# Patient Record
Sex: MALE | Race: WHITE | Employment: UNEMPLOYED | URBAN - METROPOLITAN AREA
[De-identification: names, ages, dates, MRNs, and addresses within clinical notes are randomized per-mention and may not be internally consistent; named-entity substitution may affect disease eponyms.]

---

## 2021-02-21 ENCOUNTER — APPOINTMENT (OUTPATIENT)
Dept: GENERAL RADIOLOGY | Age: 36
DRG: 897 | End: 2021-02-21

## 2021-02-21 ENCOUNTER — HOSPITAL ENCOUNTER (INPATIENT)
Age: 36
LOS: 2 days | Discharge: HOME | DRG: 897 | End: 2021-02-23
Attending: EMERGENCY MEDICINE | Admitting: INTERNAL MEDICINE

## 2021-02-21 ENCOUNTER — APPOINTMENT (OUTPATIENT)
Dept: CT IMAGING | Age: 36
DRG: 897 | End: 2021-02-21

## 2021-02-21 DIAGNOSIS — R56.9 NEW ONSET SEIZURE (HCC): Primary | ICD-10-CM

## 2021-02-21 DIAGNOSIS — F10.939 ALCOHOL WITHDRAWAL SYNDROME WITH COMPLICATION (HCC): ICD-10-CM

## 2021-02-21 DIAGNOSIS — R79.89 ELEVATED LIVER FUNCTION TESTS: ICD-10-CM

## 2021-02-21 PROBLEM — F10.930 SEIZURE DUE TO ALCOHOL WITHDRAWAL, UNCOMPLICATED (HCC): Status: ACTIVE | Noted: 2021-02-21

## 2021-02-21 LAB
A/G RATIO: 1.3 (ref 1.1–2.2)
ACETAMINOPHEN LEVEL: <5 UG/ML (ref 10–30)
ALBUMIN SERPL-MCNC: 4.3 G/DL (ref 3.4–5)
ALP BLD-CCNC: 127 U/L (ref 40–129)
ALT SERPL-CCNC: 71 U/L (ref 10–40)
AMPHETAMINE SCREEN, URINE: NORMAL
ANION GAP SERPL CALCULATED.3IONS-SCNC: 27 MMOL/L (ref 3–16)
AST SERPL-CCNC: 263 U/L (ref 15–37)
BARBITURATE SCREEN URINE: NORMAL
BASOPHILS ABSOLUTE: 0.1 K/UL (ref 0–0.2)
BASOPHILS RELATIVE PERCENT: 1.4 %
BENZODIAZEPINE SCREEN, URINE: NORMAL
BILIRUB SERPL-MCNC: 1.5 MG/DL (ref 0–1)
BILIRUBIN URINE: NEGATIVE
BLOOD, URINE: NEGATIVE
BUN BLDV-MCNC: 4 MG/DL (ref 7–20)
CALCIUM SERPL-MCNC: 9.5 MG/DL (ref 8.3–10.6)
CANNABINOID SCREEN URINE: NORMAL
CHLORIDE BLD-SCNC: 91 MMOL/L (ref 99–110)
CLARITY: CLEAR
CO2: 14 MMOL/L (ref 21–32)
COCAINE METABOLITE SCREEN URINE: NORMAL
COLOR: YELLOW
CREAT SERPL-MCNC: 0.8 MG/DL (ref 0.9–1.3)
EOSINOPHILS ABSOLUTE: 0 K/UL (ref 0–0.6)
EOSINOPHILS RELATIVE PERCENT: 1.1 %
ETHANOL: NORMAL MG/DL (ref 0–0.08)
GFR AFRICAN AMERICAN: >60
GFR NON-AFRICAN AMERICAN: >60
GLOBULIN: 3.4 G/DL
GLUCOSE BLD-MCNC: 154 MG/DL (ref 70–99)
GLUCOSE URINE: NEGATIVE MG/DL
HCT VFR BLD CALC: 37.5 % (ref 40.5–52.5)
HEMOGLOBIN: 12.3 G/DL (ref 13.5–17.5)
KETONES, URINE: NEGATIVE MG/DL
LACTIC ACID: 11.9 MMOL/L (ref 0.4–2)
LEUKOCYTE ESTERASE, URINE: NEGATIVE
LIPASE: 23 U/L (ref 13–60)
LYMPHOCYTES ABSOLUTE: 1.3 K/UL (ref 1–5.1)
LYMPHOCYTES RELATIVE PERCENT: 27.8 %
Lab: NORMAL
MAGNESIUM: 2.2 MG/DL (ref 1.8–2.4)
MCH RBC QN AUTO: 31.9 PG (ref 26–34)
MCHC RBC AUTO-ENTMCNC: 32.8 G/DL (ref 31–36)
MCV RBC AUTO: 97.2 FL (ref 80–100)
METHADONE SCREEN, URINE: NORMAL
MICROSCOPIC EXAMINATION: ABNORMAL
MONOCYTES ABSOLUTE: 0.6 K/UL (ref 0–1.3)
MONOCYTES RELATIVE PERCENT: 12.7 %
NEUTROPHILS ABSOLUTE: 2.6 K/UL (ref 1.7–7.7)
NEUTROPHILS RELATIVE PERCENT: 57 %
NITRITE, URINE: NEGATIVE
OPIATE SCREEN URINE: NORMAL
OXYCODONE URINE: NORMAL
PDW BLD-RTO: 18.5 % (ref 12.4–15.4)
PH UA: 7.5
PH UA: 7.5 (ref 5–8)
PHENCYCLIDINE SCREEN URINE: NORMAL
PLATELET # BLD: 139 K/UL (ref 135–450)
PLATELET SLIDE REVIEW: ADEQUATE
PMV BLD AUTO: 8.9 FL (ref 5–10.5)
POTASSIUM REFLEX MAGNESIUM: 3.3 MMOL/L (ref 3.5–5.1)
PROPOXYPHENE SCREEN: NORMAL
PROTEIN UA: NEGATIVE MG/DL
RBC # BLD: 3.85 M/UL (ref 4.2–5.9)
SALICYLATE, SERUM: <0.3 MG/DL (ref 15–30)
SODIUM BLD-SCNC: 132 MMOL/L (ref 136–145)
SPECIFIC GRAVITY UA: 1.01 (ref 1–1.03)
TOTAL PROTEIN: 7.7 G/DL (ref 6.4–8.2)
URINE TYPE: ABNORMAL
UROBILINOGEN, URINE: 4 E.U./DL
WBC # BLD: 4.6 K/UL (ref 4–11)

## 2021-02-21 PROCEDURE — 80143 DRUG ASSAY ACETAMINOPHEN: CPT

## 2021-02-21 PROCEDURE — 83605 ASSAY OF LACTIC ACID: CPT

## 2021-02-21 PROCEDURE — 81003 URINALYSIS AUTO W/O SCOPE: CPT

## 2021-02-21 PROCEDURE — 83690 ASSAY OF LIPASE: CPT

## 2021-02-21 PROCEDURE — 6360000002 HC RX W HCPCS: Performed by: EMERGENCY MEDICINE

## 2021-02-21 PROCEDURE — HZ2ZZZZ DETOXIFICATION SERVICES FOR SUBSTANCE ABUSE TREATMENT: ICD-10-PCS | Performed by: EMERGENCY MEDICINE

## 2021-02-21 PROCEDURE — 2060000000 HC ICU INTERMEDIATE R&B

## 2021-02-21 PROCEDURE — 80179 DRUG ASSAY SALICYLATE: CPT

## 2021-02-21 PROCEDURE — 6370000000 HC RX 637 (ALT 250 FOR IP): Performed by: EMERGENCY MEDICINE

## 2021-02-21 PROCEDURE — 85025 COMPLETE CBC W/AUTO DIFF WBC: CPT

## 2021-02-21 PROCEDURE — 96374 THER/PROPH/DIAG INJ IV PUSH: CPT

## 2021-02-21 PROCEDURE — 70450 CT HEAD/BRAIN W/O DYE: CPT

## 2021-02-21 PROCEDURE — 96375 TX/PRO/DX INJ NEW DRUG ADDON: CPT

## 2021-02-21 PROCEDURE — 71045 X-RAY EXAM CHEST 1 VIEW: CPT

## 2021-02-21 PROCEDURE — 82077 ASSAY SPEC XCP UR&BREATH IA: CPT

## 2021-02-21 PROCEDURE — 99285 EMERGENCY DEPT VISIT HI MDM: CPT

## 2021-02-21 PROCEDURE — 80307 DRUG TEST PRSMV CHEM ANLYZR: CPT

## 2021-02-21 PROCEDURE — 83735 ASSAY OF MAGNESIUM: CPT

## 2021-02-21 PROCEDURE — 93005 ELECTROCARDIOGRAM TRACING: CPT | Performed by: EMERGENCY MEDICINE

## 2021-02-21 PROCEDURE — 80053 COMPREHEN METABOLIC PANEL: CPT

## 2021-02-21 PROCEDURE — 2580000003 HC RX 258: Performed by: EMERGENCY MEDICINE

## 2021-02-21 PROCEDURE — 96361 HYDRATE IV INFUSION ADD-ON: CPT

## 2021-02-21 RX ORDER — LORAZEPAM 1 MG/1
1 TABLET ORAL
Status: DISCONTINUED | OUTPATIENT
Start: 2021-02-21 | End: 2021-02-23

## 2021-02-21 RX ORDER — ONDANSETRON 2 MG/ML
4 INJECTION INTRAMUSCULAR; INTRAVENOUS EVERY 6 HOURS PRN
Status: DISCONTINUED | OUTPATIENT
Start: 2021-02-21 | End: 2021-02-23 | Stop reason: HOSPADM

## 2021-02-21 RX ORDER — 0.9 % SODIUM CHLORIDE 0.9 %
1000 INTRAVENOUS SOLUTION INTRAVENOUS ONCE
Status: COMPLETED | OUTPATIENT
Start: 2021-02-21 | End: 2021-02-22

## 2021-02-21 RX ORDER — THIAMINE HYDROCHLORIDE 100 MG/ML
100 INJECTION, SOLUTION INTRAMUSCULAR; INTRAVENOUS ONCE
Status: COMPLETED | OUTPATIENT
Start: 2021-02-21 | End: 2021-02-21

## 2021-02-21 RX ORDER — GAUZE BANDAGE 2" X 2"
100 BANDAGE TOPICAL DAILY
Status: DISCONTINUED | OUTPATIENT
Start: 2021-02-22 | End: 2021-02-23 | Stop reason: HOSPADM

## 2021-02-21 RX ORDER — LORAZEPAM 2 MG/ML
1 INJECTION INTRAMUSCULAR
Status: ACTIVE | OUTPATIENT
Start: 2021-02-21 | End: 2021-02-21

## 2021-02-21 RX ORDER — POTASSIUM CHLORIDE 20 MEQ/1
40 TABLET, EXTENDED RELEASE ORAL PRN
Status: DISCONTINUED | OUTPATIENT
Start: 2021-02-21 | End: 2021-02-23 | Stop reason: HOSPADM

## 2021-02-21 RX ORDER — ACETAMINOPHEN 500 MG
1000 TABLET ORAL ONCE
Status: COMPLETED | OUTPATIENT
Start: 2021-02-21 | End: 2021-02-21

## 2021-02-21 RX ORDER — SODIUM CHLORIDE, SODIUM LACTATE, POTASSIUM CHLORIDE, CALCIUM CHLORIDE 600; 310; 30; 20 MG/100ML; MG/100ML; MG/100ML; MG/100ML
INJECTION, SOLUTION INTRAVENOUS CONTINUOUS
Status: ACTIVE | OUTPATIENT
Start: 2021-02-21 | End: 2021-02-22

## 2021-02-21 RX ORDER — LORAZEPAM 2 MG/ML
3 INJECTION INTRAMUSCULAR
Status: DISCONTINUED | OUTPATIENT
Start: 2021-02-21 | End: 2021-02-23

## 2021-02-21 RX ORDER — LORAZEPAM 1 MG/1
2 TABLET ORAL
Status: DISCONTINUED | OUTPATIENT
Start: 2021-02-21 | End: 2021-02-23

## 2021-02-21 RX ORDER — SODIUM CHLORIDE 0.9 % (FLUSH) 0.9 %
10 SYRINGE (ML) INJECTION EVERY 12 HOURS SCHEDULED
Status: DISCONTINUED | OUTPATIENT
Start: 2021-02-21 | End: 2021-02-23 | Stop reason: HOSPADM

## 2021-02-21 RX ORDER — ACETAMINOPHEN 650 MG/1
650 SUPPOSITORY RECTAL EVERY 6 HOURS PRN
Status: DISCONTINUED | OUTPATIENT
Start: 2021-02-21 | End: 2021-02-22

## 2021-02-21 RX ORDER — LORAZEPAM 2 MG/ML
1 INJECTION INTRAMUSCULAR
Status: DISCONTINUED | OUTPATIENT
Start: 2021-02-21 | End: 2021-02-23

## 2021-02-21 RX ORDER — LORAZEPAM 2 MG/ML
4 INJECTION INTRAMUSCULAR
Status: DISCONTINUED | OUTPATIENT
Start: 2021-02-21 | End: 2021-02-23

## 2021-02-21 RX ORDER — LORAZEPAM 1 MG/1
3 TABLET ORAL
Status: DISCONTINUED | OUTPATIENT
Start: 2021-02-21 | End: 2021-02-23

## 2021-02-21 RX ORDER — ACETAMINOPHEN 325 MG/1
650 TABLET ORAL EVERY 6 HOURS PRN
Status: DISCONTINUED | OUTPATIENT
Start: 2021-02-21 | End: 2021-02-22

## 2021-02-21 RX ORDER — POLYETHYLENE GLYCOL 3350 17 G/17G
17 POWDER, FOR SOLUTION ORAL DAILY PRN
Status: DISCONTINUED | OUTPATIENT
Start: 2021-02-21 | End: 2021-02-23 | Stop reason: HOSPADM

## 2021-02-21 RX ORDER — PROMETHAZINE HYDROCHLORIDE 12.5 MG/1
12.5 TABLET ORAL EVERY 6 HOURS PRN
Status: DISCONTINUED | OUTPATIENT
Start: 2021-02-21 | End: 2021-02-23 | Stop reason: HOSPADM

## 2021-02-21 RX ORDER — LORAZEPAM 2 MG/ML
2 INJECTION INTRAMUSCULAR
Status: DISCONTINUED | OUTPATIENT
Start: 2021-02-21 | End: 2021-02-23

## 2021-02-21 RX ORDER — SODIUM CHLORIDE 0.9 % (FLUSH) 0.9 %
10 SYRINGE (ML) INJECTION PRN
Status: DISCONTINUED | OUTPATIENT
Start: 2021-02-21 | End: 2021-02-23 | Stop reason: HOSPADM

## 2021-02-21 RX ORDER — 0.9 % SODIUM CHLORIDE 0.9 %
1000 INTRAVENOUS SOLUTION INTRAVENOUS ONCE
Status: COMPLETED | OUTPATIENT
Start: 2021-02-21 | End: 2021-02-21

## 2021-02-21 RX ORDER — POTASSIUM CHLORIDE 750 MG/1
40 TABLET, EXTENDED RELEASE ORAL ONCE
Status: COMPLETED | OUTPATIENT
Start: 2021-02-21 | End: 2021-02-21

## 2021-02-21 RX ORDER — MAGNESIUM SULFATE IN WATER 40 MG/ML
2000 INJECTION, SOLUTION INTRAVENOUS PRN
Status: DISCONTINUED | OUTPATIENT
Start: 2021-02-21 | End: 2021-02-23 | Stop reason: HOSPADM

## 2021-02-21 RX ORDER — MULTIVITAMIN WITH IRON
1 TABLET ORAL DAILY
Status: DISCONTINUED | OUTPATIENT
Start: 2021-02-22 | End: 2021-02-23 | Stop reason: HOSPADM

## 2021-02-21 RX ORDER — LORAZEPAM 2 MG/ML
1 INJECTION INTRAMUSCULAR ONCE
Status: COMPLETED | OUTPATIENT
Start: 2021-02-21 | End: 2021-02-21

## 2021-02-21 RX ORDER — POTASSIUM CHLORIDE 7.45 MG/ML
10 INJECTION INTRAVENOUS PRN
Status: DISCONTINUED | OUTPATIENT
Start: 2021-02-21 | End: 2021-02-23 | Stop reason: HOSPADM

## 2021-02-21 RX ORDER — LORAZEPAM 2 MG/ML
2 INJECTION INTRAMUSCULAR EVERY 6 HOURS PRN
Status: DISCONTINUED | OUTPATIENT
Start: 2021-02-21 | End: 2021-02-23

## 2021-02-21 RX ORDER — LORAZEPAM 1 MG/1
4 TABLET ORAL
Status: DISCONTINUED | OUTPATIENT
Start: 2021-02-21 | End: 2021-02-23

## 2021-02-21 RX ADMIN — THIAMINE HYDROCHLORIDE 100 MG: 100 INJECTION, SOLUTION INTRAMUSCULAR; INTRAVENOUS at 18:00

## 2021-02-21 RX ADMIN — POTASSIUM CHLORIDE 40 MEQ: 750 TABLET, EXTENDED RELEASE ORAL at 19:03

## 2021-02-21 RX ADMIN — SODIUM CHLORIDE 1000 ML: 9 INJECTION, SOLUTION INTRAVENOUS at 17:49

## 2021-02-21 RX ADMIN — LORAZEPAM 1 MG: 2 INJECTION INTRAMUSCULAR; INTRAVENOUS at 17:50

## 2021-02-21 RX ADMIN — SODIUM CHLORIDE 1000 ML: 9 INJECTION, SOLUTION INTRAVENOUS at 19:04

## 2021-02-21 RX ADMIN — ACETAMINOPHEN 1000 MG: 500 TABLET ORAL at 19:03

## 2021-02-21 NOTE — ED PROVIDER NOTES
CRITICAL CARE NOTE  There was a high probability of clinical significant / life threatening deterioration in the patient's condition which required my urgent intervention. Total critical care time was at least 31 minutes excluding any separately reported procedures. TRIAGE CHIEF COMPLAINT:   Chief Complaint   Patient presents with    Seizures       HPI: Brittney Tanner is a 38 y.o. male who presents to the emergency department with complaint of seizure possibly related to alcohol. [facility] that he was leaving his work at the [employer] when he apparently passed out or fell. People tried to help him but noticed that he was shaking. His blood sugar was 166. The patient is amnesic for the episode. There was no incontinence but he did have tongue biting. He denies history of previous seizure. Denies headache or neck pain. Patient has a history of alcohol abuse and has been seen at least twice in the ED for alcohol intoxication. He did have a DUI and underwent treatment a number of years ago at the [facility]. He states he drinks at least 6 beers a day and sometimes shots. The patient last drank alcohol yesterday. He last had something to eat last night and came to work today around 10 AM.  Denies chest pain or shortness of breath. He has no abdominal pain. REVIEW OF SYSTEMS:   10 systems reviewed. Pertinent positives per HPI. Otherwise noted to be negative. I have reviewed the triage/nursing documentation and agree unless otherwise noted below. PAST MEDICAL HISTORY:   History reviewed. No pertinent past medical history. CURRENT MEDICATIONS:   Patient's Medications    No medications on file        SURGICAL HISTORY:       Procedure Laterality Date    FRACTURE SURGERY  ankle        FAMILY HISTORY:   History reviewed. No pertinent family history. SOCIAL HISTORY:    reports that he has never smoked.  He has never used smokeless tobacco. He reports current alcohol use of about 30.0 standard drinks of alcohol per week. He reports that he does not use drugs. ALLERGIES: No Known Allergies    PHYSICAL EXAM:  VITAL SIGNS: /81   Pulse 128   Temp 97.7 °F (36.5 °C)   Wt 155 lb (70.3 kg)   SpO2 100%   BMI 25.79 kg/m²   Constitutional:  No acute distress, Non-toxic appearance. Patient is postictal.  HENT: Normocephalic, Atraumatic Oropharynx moist, No oral exudates. TMs are normal.  Superficial laceration on the right side of the tongue. No other intraoral injury. Eyes:  PERRL, EOMI, Conjunctiva normal, No discharge. No nystagmus. Neck: No tenderness, Supple, No lymphadenopathy, No stridor. Cardiovascular: Regular tachycardia at a rate of 125., No murmurs, No rubs, No gallops. Pulmonary/Chest:  Normal breath sounds, No respiratory distress, No wheezing,  Abdomen:   Soft, No tenderness, No masses, No pulsatile masses  Back:  No tenderness, No CVA tenderness  Extremities:  Normal range of motion, Intact distal pulses, No edema, No tenderness  Neurologic:  Alert & oriented x 3, Speech is clear and appropriate, No upper extremity drift or lower extremity weakness,  Normal sensory function, No facial asymmetry, no truncal or extremity ataxia. Normal gait. Skin:  Warm, Dry, No erythema, No rash  Psychiatric:  Affect normal, Mood normal      EKG:    EKG interpreted by myself. Sinus tachycardia at a rate of 118. Axis is 55. There is no ischemia. No ectopy noted. QTC is 479. Radiology:  CT Head WO Contrast   Final Result      1. No acute intracranial process. XR CHEST PORTABLE   Final Result      No acute pulmonary disease.              LAB  Labs Reviewed   CBC WITH AUTO DIFFERENTIAL - Abnormal; Notable for the following components:       Result Value    RBC 3.85 (*)     Hemoglobin 12.3 (*)     Hematocrit 37.5 (*)     RDW 18.5 (*)     All other components within normal limits    Narrative:     Performed at:  [facility]  [address],  [place] [facility] [place]   Phone [phone]   COMPREHENSIVE METABOLIC PANEL W/ REFLEX TO MG FOR LOW K - Abnormal; Notable for the following components:    Sodium 132 (*)     Potassium reflex Magnesium 3.3 (*)     Chloride 91 (*)     CO2 14 (*)     Anion Gap 27 (*)     Glucose 154 (*)     BUN 4 (*)     CREATININE 0.8 (*)     Total Bilirubin 1.5 (*)     ALT 71 (*)      (*)     All other components within normal limits    Narrative:     [employer] tel. [phone],  Chemistry results called to and read back by Ingrid Helm RN. , 02/21/2021  18:13, by [facility]  Performed at:  [facility]  [facility] [address] [facility] [place]   Phone [phone]   LACTIC ACID, PLASMA - Abnormal; Notable for the following components:    Lactic Acid 11.9 (*)     All other components within normal limits    Narrative:     [employer] tel. [phone],  Chemistry results called to and read back by Ingrid Helm RN. , 02/21/2021  18:14, by [facility]  Performed at:  [facility]  [place] [address] [facility] [place]   Phone [phone] LEVEL - Abnormal; Notable for the following components:    Acetaminophen Level <5 (*)     All other components within normal limits    Narrative:     [employer] tel. [phone],  Chemistry results called to and read back by Ingrid Helm RN. , 02/21/2021  18:13, by [facility]  Performed at:  [facility]  [place] [address],  [facility] [place]   Phone [phone]   SALICYLATE LEVEL - Abnormal; Notable for the following components:    Salicylate, Serum <4.7 (*)     All other components within normal limits    Narrative:     [employer] [employer] tel. [phone],  Chemistry results called to and read back by Ingrid Helm RN. , 02/21/2021  18:13, by [facility]  Performed at:  [facility]  [address],  [place], [address]   Phone [phone]   LIPASE    Narrative:     [facility] tel. [phone],  Chemistry results called to and read back by Andreina Cadet RN. , 02/21/2021  18:13, by [facility]  Performed at:  [facility]  [address],  [place], [address]   Phone [phone]   ETHANOL    Narrative:     Gino June,  Chemistry results called to and read back by Andreina Cadet RN. , 02/21/2021  18:13, by [facility]  Performed at:  [facility]  [address],  [place], [address]   Phone [phone]   MAGNESIUM    Narrative:     Gino June,  Chemistry results called to and read back by Andreina Cadet RN. , 02/21/2021  18:13, by [facility]  Performed at:  [facility]  [address],  [place], [address]   Phone [phone]   [facility]       ED COURSE & MEDICAL DECISION MAKING:  Pertinent Labs & Imaging studies reviewed. (See chart for details)  38-year-old male with long history of alcohol abuse, history of previous alcohol treatment, was leaving work this evening when he apparently fell or passed out and the people who tried to help him noticed that he was shaking. He did bite his tongue. No incontinence. He is amnesic for the episode. Denies headache or neck pain. He denies history of previous seizure. He drinks at least 6 beers a day plus occasional shots of liquor. He last drank last night. He has not had anything to eat since last night but his blood sugar by [employer] was 166. Presents here amnesic and postictal with tachycardia but normal blood pressure and normal O2 saturation. He has no focal neurologic findings. He is mildly tremulous. Initial CIWA was 4. He was given a dose of Ativan. IV fluids were started. He was given thiamine.   Tylenol was ordered for complaints of headache. CBC was normal.  CMP shows sodium 132, potassium 3.3, chloride 91, bicarb 14 and anion gap 27. Blood sugar was 154. BUN and creatinine were normal.  Total bilirubin was 1.5 with normal alk phos, ALT 71 and . Lipase was normal.  Alcohol level was 0. Lactic acid was elevated at 11.9. Acetaminophen and salicylate levels were negative. CT head scan and chest x-ray read by the radiologist and reviewed by myself shows no acute abnormality. Patient has not yet obtained urine specimen. UA and UDS are pending. Patient was given potassium by mouth. Recheck heart rate was 104 and recheck blood pressure 120/77 with room air O2 sat 99%. He has no focal neurologic findings. He is receiving IV fluids. Prn needed Ativan was ordered for withdrawal symptoms, anxiety or seizure. Patient has a lactic acidosis secondary to seizure activity. No evidence of sepsis. I feel the patient needs admission for new onset seizure and alcohol withdrawal.  Patient is agreeable. A call was placed to the transfer center at [address] I am awaiting a return call from the transfer center to admit the patient to the hospitalist at [facility].. Patient admitted by Dr Rayna Bardales.          (Please note that portions of this note may have been completed with a voice recognition program.  Efforts were made to edit the dictation but occasionally words are mis-transcribed)      FINAL IMPRESSION:  1 --new onset seizure  2 --alcohol withdrawal with complication  3 --elevated liver function tests               Sushant Kam MD  02/21/21 1958

## 2021-02-21 NOTE — ED TRIAGE NOTES
Pt arrived via [facility] for seizure that lasted approx 1 minute per EMS. No h/o seizures, but EMS states that friends mentioned patient has had seizure with ETOH w/d in past. Patient denies ETOH use today. Last drink ETOH yesterday but unsure of time \"maybe 5 or 6 PM\". Patient denies pain.

## 2021-02-22 LAB
A/G RATIO: 1.3 (ref 1.1–2.2)
ALBUMIN SERPL-MCNC: 3.2 G/DL (ref 3.4–5)
ALP BLD-CCNC: 101 U/L (ref 40–129)
ALT SERPL-CCNC: 50 U/L (ref 10–40)
ANION GAP SERPL CALCULATED.3IONS-SCNC: 10 MMOL/L (ref 3–16)
AST SERPL-CCNC: 208 U/L (ref 15–37)
BASOPHILS ABSOLUTE: 0 K/UL (ref 0–0.2)
BASOPHILS RELATIVE PERCENT: 1.5 %
BILIRUB SERPL-MCNC: 1.5 MG/DL (ref 0–1)
BUN BLDV-MCNC: 3 MG/DL (ref 7–20)
CALCIUM SERPL-MCNC: 8.4 MG/DL (ref 8.3–10.6)
CHLORIDE BLD-SCNC: 103 MMOL/L (ref 99–110)
CO2: 21 MMOL/L (ref 21–32)
CREAT SERPL-MCNC: 0.6 MG/DL (ref 0.9–1.3)
EKG ATRIAL RATE: 118 BPM
EKG DIAGNOSIS: NORMAL
EKG P AXIS: 58 DEGREES
EKG P-R INTERVAL: 118 MS
EKG Q-T INTERVAL: 342 MS
EKG QRS DURATION: 80 MS
EKG QTC CALCULATION (BAZETT): 479 MS
EKG R AXIS: 55 DEGREES
EKG T AXIS: 31 DEGREES
EKG VENTRICULAR RATE: 118 BPM
EOSINOPHILS ABSOLUTE: 0.1 K/UL (ref 0–0.6)
EOSINOPHILS RELATIVE PERCENT: 2.2 %
GFR AFRICAN AMERICAN: >60
GFR NON-AFRICAN AMERICAN: >60
GLOBULIN: 2.5 G/DL
GLUCOSE BLD-MCNC: 84 MG/DL (ref 70–99)
HCT VFR BLD CALC: 32.5 % (ref 40.5–52.5)
HEMOGLOBIN: 10.8 G/DL (ref 13.5–17.5)
LACTIC ACID: 0.8 MMOL/L (ref 0.4–2)
LACTIC ACID: 0.8 MMOL/L (ref 0.4–2)
LYMPHOCYTES ABSOLUTE: 0.8 K/UL (ref 1–5.1)
LYMPHOCYTES RELATIVE PERCENT: 25.5 %
MCH RBC QN AUTO: 32.4 PG (ref 26–34)
MCHC RBC AUTO-ENTMCNC: 33.3 G/DL (ref 31–36)
MCV RBC AUTO: 97.3 FL (ref 80–100)
MONOCYTES ABSOLUTE: 0.4 K/UL (ref 0–1.3)
MONOCYTES RELATIVE PERCENT: 11.1 %
NEUTROPHILS ABSOLUTE: 2 K/UL (ref 1.7–7.7)
NEUTROPHILS RELATIVE PERCENT: 59.7 %
PDW BLD-RTO: 18.1 % (ref 12.4–15.4)
PLATELET # BLD: 86 K/UL (ref 135–450)
PLATELET SLIDE REVIEW: ABNORMAL
PMV BLD AUTO: 8.2 FL (ref 5–10.5)
POTASSIUM REFLEX MAGNESIUM: 4 MMOL/L (ref 3.5–5.1)
RBC # BLD: 3.34 M/UL (ref 4.2–5.9)
SODIUM BLD-SCNC: 134 MMOL/L (ref 136–145)
TOTAL PROTEIN: 5.7 G/DL (ref 6.4–8.2)
WBC # BLD: 3.3 K/UL (ref 4–11)

## 2021-02-22 PROCEDURE — 2580000003 HC RX 258: Performed by: INTERNAL MEDICINE

## 2021-02-22 PROCEDURE — 85025 COMPLETE CBC W/AUTO DIFF WBC: CPT

## 2021-02-22 PROCEDURE — 2060000000 HC ICU INTERMEDIATE R&B

## 2021-02-22 PROCEDURE — 6370000000 HC RX 637 (ALT 250 FOR IP): Performed by: INTERNAL MEDICINE

## 2021-02-22 PROCEDURE — 80053 COMPREHEN METABOLIC PANEL: CPT

## 2021-02-22 PROCEDURE — 83605 ASSAY OF LACTIC ACID: CPT

## 2021-02-22 PROCEDURE — 36415 COLL VENOUS BLD VENIPUNCTURE: CPT

## 2021-02-22 PROCEDURE — 93010 ELECTROCARDIOGRAM REPORT: CPT | Performed by: INTERNAL MEDICINE

## 2021-02-22 PROCEDURE — 6360000002 HC RX W HCPCS: Performed by: INTERNAL MEDICINE

## 2021-02-22 PROCEDURE — 94760 N-INVAS EAR/PLS OXIMETRY 1: CPT

## 2021-02-22 RX ORDER — SODIUM CHLORIDE, SODIUM LACTATE, POTASSIUM CHLORIDE, CALCIUM CHLORIDE 600; 310; 30; 20 MG/100ML; MG/100ML; MG/100ML; MG/100ML
INJECTION, SOLUTION INTRAVENOUS CONTINUOUS
Status: ACTIVE | OUTPATIENT
Start: 2021-02-22 | End: 2021-02-22

## 2021-02-22 RX ORDER — FOLIC ACID 1 MG/1
1 TABLET ORAL DAILY
Status: DISCONTINUED | OUTPATIENT
Start: 2021-02-22 | End: 2021-02-23 | Stop reason: HOSPADM

## 2021-02-22 RX ORDER — BENZOCAINE/MENTHOL 6 MG-10 MG
LOZENGE MUCOUS MEMBRANE 2 TIMES DAILY
Status: DISCONTINUED | OUTPATIENT
Start: 2021-02-22 | End: 2021-02-23 | Stop reason: HOSPADM

## 2021-02-22 RX ORDER — KETOROLAC TROMETHAMINE 30 MG/ML
15 INJECTION, SOLUTION INTRAMUSCULAR; INTRAVENOUS
Status: ACTIVE | OUTPATIENT
Start: 2021-02-22 | End: 2021-02-22

## 2021-02-22 RX ORDER — DIAPER,BRIEF,INFANT-TODD,DISP
EACH MISCELLANEOUS 2 TIMES DAILY
Status: DISCONTINUED | OUTPATIENT
Start: 2021-02-22 | End: 2021-02-22

## 2021-02-22 RX ADMIN — THIAMINE HCL TAB 100 MG 100 MG: 100 TAB at 08:20

## 2021-02-22 RX ADMIN — ENOXAPARIN SODIUM 40 MG: 40 INJECTION SUBCUTANEOUS at 08:20

## 2021-02-22 RX ADMIN — Medication 10 ML: at 21:27

## 2021-02-22 RX ADMIN — LORAZEPAM 1 MG: 2 INJECTION INTRAMUSCULAR; INTRAVENOUS at 02:45

## 2021-02-22 RX ADMIN — THERA TABS 1 TABLET: TAB at 08:20

## 2021-02-22 RX ADMIN — SODIUM CHLORIDE, POTASSIUM CHLORIDE, SODIUM LACTATE AND CALCIUM CHLORIDE: 600; 310; 30; 20 INJECTION, SOLUTION INTRAVENOUS at 00:06

## 2021-02-22 RX ADMIN — HYDROCORTISONE: 0.01 CREAM TOPICAL at 17:31

## 2021-02-22 RX ADMIN — SODIUM CHLORIDE, POTASSIUM CHLORIDE, SODIUM LACTATE AND CALCIUM CHLORIDE: 600; 310; 30; 20 INJECTION, SOLUTION INTRAVENOUS at 10:58

## 2021-02-22 RX ADMIN — FOLIC ACID 1 MG: 1 TABLET ORAL at 10:58

## 2021-02-22 RX ADMIN — HYDROCORTISONE: 0.01 CREAM TOPICAL at 21:27

## 2021-02-22 RX ADMIN — Medication 10 ML: at 00:06

## 2021-02-22 ASSESSMENT — PAIN SCALES - GENERAL: PAINLEVEL_OUTOF10: 0

## 2021-02-22 NOTE — PLAN OF CARE
Falls - Risk of:  Goal: Absence of physical injury  Outcome: Ongoing  Note: Mr. Tanisha Camarena is coordinated and moving well. I will continue to be near during ambulation to prevent any injury from a seizure to possibly occur. Coping:  Goal: Ability to identify appropriate support needs will improve  Description: Ability to identify appropriate support needs will improve  Outcome: Ongoing     Health Behavior:  Goal: Ability to manage health-related needs will improve  Outcome: Ongoing  Note: Mr. Tanisha Camarena has been receiving vitamin supplementation during his treatment for alcohol withdrawal. I will continue to educate on the importance of taking vitamins when chronically using alcohol. Physical Regulation:  Goal: Ability to maintain a stable neurologic state will improve  Outcome: Ongoing  Note: Neuro assessment is WDL. No tremors or hallucinations at this time.

## 2021-02-22 NOTE — PROGRESS NOTES
Hospitalist Progress Note      PCP: No primary care provider on file. Date of Admission: 2/21/2021    Chief Complaint:     Chief Complaint   Patient presents with   [facility] Course    Patient is 49-year-old male with history of alcohol abuse was presented to [place] ER after he apparently passed out. Bystanders witnessed seizure activity. Patient drinks alcohol daily drinks 5-6 beers with a few shots of hard liquor. History of DUI. Patient denies any prior history of seizures. Subjective:  Patient seen and examined at the bedside. No complaints at this time. Reports feeling tired this morning. Having some hand tremors.  Denies N/V/F/C    PFHS: Reviewed as documented 2/21/2021, no changes    Medications:  Reviewed    Infusion Medications    lactated ringers 100 mL/hr at 02/22/21 0006     Scheduled Medications    sodium chloride flush  10 mL Intravenous 2 times per day    enoxaparin  40 mg Subcutaneous Daily    thiamine  100 mg Oral Daily    multivitamin  1 tablet Oral Daily     PRN Meds: sodium chloride flush, promethazine **OR** ondansetron, polyethylene glycol, potassium chloride **OR** potassium alternative oral replacement **OR** potassium chloride, magnesium sulfate, LORazepam **OR** LORazepam **OR** LORazepam **OR** LORazepam **OR** LORazepam **OR** LORazepam **OR** LORazepam **OR** LORazepam, LORazepam      Intake/Output Summary (Last 24 hours) at 2/22/2021 4601  Last data filed at 2/22/2021 0239  Gross per 24 hour   Intake --   Output 450 ml   Net -450 ml     Physical Exam  /70   Pulse 78   Temp 98 °F (36.7 °C) (Oral)   Resp 16   Wt 155 lb (70.3 kg)   SpO2 98%   BMI 25.79 kg/m²   General appearance:  No acute distress, appears stated age  Eyes: Pupils equal, round, reactive to light, conjunctiva/corneas clear  Ears/Nose/Mouth/Throat: No external lesions or scars, hearing intact to voice  Neck: Trachea midline, no masses noted, no thyromegaly  Respiratory: Non-labored breathing, clear to auscultation bilaterally  Cardiovascular: Regular rate and rhythm, no murmurs, gallops, or rubs  Abdomen: soft, non-tender, non-distended  Musculoskeletal: Warm, well perfused, no cyanosis or edema   Skin: Normal color, skin thickened and scabbed over waistband area of stomach   Neurologic: Mild tremor of bilateral hands   Psychiatric: A&Ox4, good insight and judgment    Labs:   Recent Labs     02/21/21  1744   WBC 4.6   HGB 12.3*   HCT 37.5*        Recent Labs     02/21/21  1744 02/22/21  0550   * 134*   K 3.3* 4.0   CL 91* 103   CO2 14* 21   BUN 4* 3*   CREATININE 0.8* 0.6*   CALCIUM 9.5 8.4     Recent Labs     02/21/21 1744 02/22/21  0550   * 208*   ALT 71* 50*   BILITOT 1.5* 1.5*   ALKPHOS 127 101     No results for input(s): INR in the last 72 hours. No results for input(s): Noé Bunn in the last 72 hours. Urinalysis:   Lab Results   Component Value Date    NITRU Negative 02/21/2021    BLOODU Negative 02/21/2021    SPECGRAV 1.010 02/21/2021    GLUCOSEU Negative 02/21/2021     Radiology:  CT Head WO Contrast   Final Result      1. No acute intracranial process. XR CHEST PORTABLE   Final Result      No acute pulmonary disease. Assessment/Plan:  Active Hospital Problems    Diagnosis Date Noted    Seizure due to alcohol withdrawal, uncomplicated ([facility] [D40.853, R56.9] 02/21/2021     #Syncope    Denies any presyncopal symptoms. Had witnessed convulsions; no incontinence or tongue biting. Most likely etiology is seizure from alcohol withdrawal. Possible orthostatic cause since patient has been eating less lately but this is less likely. Blood glucose 166 on scene. - Seizure precautions in place   - CIWA protocol with IV Ativan PRN  - Monitor and replete electrolytes PRN   - Check orthostatic vitals today     #Seizure likely related to alcohol withdrawal.  This is a first episode. Counseled regarding alcohol cessation.   No need for AED.    #Concern for alcohol withdrawal   - CIWA protocol as above  - Monitor through at least 48 hours after last drink (this evening)    #H/o alcohol abuse   - Thiamine 100mg daily   - Multivitamin daily   - Social service consulted     #Alcoholic hepatitis   Admission total bilirubin 1.5, ALT 71, . - Avoid hepatotoxic agents     DVT Prophylaxis: Lovenox  Diet: DIET GENERAL;  Code Status: Full Code    PT/OT Eval Status: n/a, baseline    Dispo: Inpt, dispo pending clinical improvement    Lucy Mai  MS4    I have independently seen and examined the patient. I discussed plan of care with medical student. I reviewed the note and made changes where appropriate. Please note that medical student worked as a scribe.     Eron Garcia  Hospitalist  Attending Physician

## 2021-02-22 NOTE — DISCHARGE INSTR - COC
Continuity of Care Form    Patient Name: Jovon Mirza   :  1985  MRN:  [id number]    Admit date:  2021  Discharge date:  ***    Code Status Order: Full Code   Advance Directives:   Advance Care Flowsheet Documentation     Date/Time Healthcare Directive Type of Healthcare Directive Copy in [address] Agent's Name Healthcare Agent's Phone Number    21 2225  No, patient does not have an advance directive for healthcare treatment -- -- -- -- --          Admitting Physician:  Silvia Rodriguez MD  PCP: No primary care provider on file.     Discharging Nurse: [facility] Unit/Room#: 7049/5742-67  Discharging Unit Phone Number: ***    Emergency Contact:   Extended Emergency Contact Information  Primary Emergency Contact: Selene Treadwell  Address: [address] [place] [address] Phone: [phone]  Relation: Parent  Secondary Emergency Contact: Selene Treadwell  Address: [address] [place] [address] Phone: [phone]  Relation: Parent    Past Surgical History:  Past Surgical History:   Procedure Laterality Date    FRACTURE SURGERY  ankle       Immunization History:   Immunization History   Administered Date(s) Administered    Tdap (Boostrix, Adacel) 2011       Active Problems:  Patient Active Problem List   Diagnosis Code    Seizure due to alcohol withdrawal, uncomplicated ([facility] Z97.764, R56.9       Isolation/Infection:   Isolation          No Isolation        Patient Infection Status     None to display          Nurse Assessment:  Last Vital Signs: /70   Pulse 73   Temp 98 °F (36.7 °C) (Oral)   Resp 16   Ht 5' 5\" (1.651 m)   Wt 155 lb (70.3 kg)   SpO2 99%   BMI 25.79 kg/m²     Last documented pain score (0-10 scale): Pain Level: 0  Last Weight:   Wt Readings from Last 1 Encounters:   21 155 lb (70.3 kg)     Mental Status:  {IP PT MENTAL STATUS:}    IV Access:  { DANIA IV ACCESS:353775013}    Nursing Mobility/ADLs:  Walking   {CHP DME KFPX:541038517}  Transfer  {CHP DME ARBZ:724295940}  Bathing  {CHP DME YEVA:525422123}  Dressing  {CHP DME NPDO:419804806}  Toileting  {CHP DME CNBZ:351611710}  Feeding  {CHP DME ZAHP:686319471}  Med Admin  {CHP DME OLEW:302675700}  Med Delivery   { DANIA MED Delivery:677063840}    Wound Care Documentation and Therapy:        Elimination:  Continence:   · Bowel: {YES / LL:28960}  · Bladder: {YES / VF:14193}  Urinary Catheter: {Urinary Catheter:873215012}   Colostomy/Ileostomy/Ileal Conduit: {YES / EZ:81759}       Date of Last BM: ***    Intake/Output Summary (Last 24 hours) at 2021 1527  Last data filed at 2021 1200  Gross per 24 hour   Intake 460 ml   Output 950 ml   Net -490 ml     I/O last 3 completed shifts:   In: 26 [P.O.:460]  Out: 950 [Urine:950]    Safety Concerns:     [employer] Safety Concerns:966961910}    Impairments/Disabilities:      [employer] Impairments/Disabilities:186257848}    Nutrition Therapy:  Current Nutrition Therapy:   [employer] Diet List:953551360}    Routes of Feeding: {[facility] DME Other Feedings:848469939}  Liquids: {Slp liquid thickness:01092}  Daily Fluid Restriction: {CHP DME Yes amt example:079041160}  Last Modified Barium Swallow with Video (Video Swallowing Test): {Done Not Done BORC:788189905}    Treatments at the Time of Hospital Discharge:   Respiratory Treatments: ***  Oxygen Therapy:  {Therapy; copd oxygen:03028}  Ventilator:    { HADLEY Vent UHTI:150882646}    Rehab Therapies: {THERAPEUTIC INTERVENTION:7495515427}  Weight Bearing Status/Restrictions: [facility] Weight Bearin}  Other Medical Equipment (for information only, NOT a DME order):  {EQUIPMENT:147960322}  Other Treatments: ***    Patient's personal belongings (please select all that are sent with patient):  {TINY DME Belongings:699745262}    RN SIGNATURE:  {Esignature:427004224}    CASE [phone]  49 [facility], 9175 [facility] - [phone]   301 [facility] [address]  706 [facility] - [phone]  Estrela [address] [place], [address] Mark Beasley. (residential, outpatient) - [phone]  [facility], 600 [facility]  1015 [facility] - [phone]  91 [facility], 3208 [facility] - [phone] (must be [facility] resident)   3531 [facility], 2401 [facility] [phone]     Crisis or Emergency Needs - [phone] ([phone]) available    2929 [facility],  [facility]     Inpatient/ Residential Treatment Just for Men:  525 [facility] Residential Treatment - [phone]  2205 [facility], 590 [facility]  [id number] [place] [address] (www.[facility]) - [phone]  34 [facility], [facility] (Men)     Inpatient/ Residential Treatment Just for Women:  R10688 Theresa Marito ([email or web address]) - [phone]  [address] (women)  The [facility]: Barber Germain (pregnant &  treatment) - [phone]  [address] [place] [address] [address], Marcello  [facility] - [phone] (must be [facility] resident;  services)   3531 [facility], [address] [place] [address] 99 - [phone] (Pregnant and  treatment)   1201 [facility], 2301 [facility],[address]    Outpatient Treatment Services:  Carmela Velazco and [address] [place] [address] for Men - [phone]   1440 [facility], [address] [place] [address] - 103 [facility], [facility]. [facility] 60  [place]- [phone]  [address] [place], [address] [place] [address] Alcohol and Drug Treatment Program WALESKA FIELDS [facility] location) - [phone]  601 [facility], [address] [place] [address],[address] (4th floor [facility])  [place] Alcohol and Drug Treatment [facility] Location) - 3073 [facility] [address] [place], [address] [facility] [address] for [address] [facility] [address] ([address]) - [phone] [address], [address]  HOSP [facility] - [phone]   [address] [place] [address]  3105 [facility] (www.[email or web address])  - [phone]  [address], 590 [facility] - [phone] [address], [address]  R [facility] 70  [address] [place], 20201 S [facility]  [address] - [phone]   [address], 1612 [facility] - [phone]   [address], 4300 [facility]  [facility] 26 - [phone]   [facility] 85 [facility], [address] [place] [address]  [address]   [address], 400 [facility] Substance Abuse - [phone]  [address] [place], [address]  [address], [address]  [address] [facility]    Methadone/ [facility] Suboxone - [phone]   1317 [facility], [address]  [address] - [phone]   2520 N [facility]  385 [facility]   [address], [address] (use lower level entrance)  [address] [place] [address] [phone]   [address], 20201 S [facility]  [facility] - [phone]   3531 [facility], [address] [place] [address]  [address] [place] [address] (www.[email or web address])  - [phone]  [address], [address] [place] [address]  Self Help Resources:  Alcoholics Anonymous Hotline (www.[place].org)  (24 hours/ 7days) - [phone]       / signature: Electronically signed by Anika Lepe RN on 2/22/21 at 3:28 PM EST    PHYSICIAN SECTION    Prognosis: {Prognosis:0916237455}    Condition at Discharge: [address] Letitia Devi Patient Condition:808633871}    Rehab Potential (if transferring to Rehab): {Prognosis:3706439442}    Recommended Labs or Other Treatments After Discharge: ***    Physician Certification: I certify the above information and transfer of Brittney Tanner  is necessary for the continuing treatment of the diagnosis listed and that he requires {Admit to Appropriate Level of Care:99701} for {GREATER/LESS:310939085} 30 days.      Update Admission H&P: {CHP DME Changes in HTFAW:126824783}    PHYSICIAN SIGNATURE:  {Esignature:820182025}

## 2021-02-22 NOTE — H&P
Hospital Medicine History & Physical      PCP: No primary care provider on file. Date of Admission: 2/21/2021    Date of Service: Pt seen/examined on 2/21/2021 and Admitted to Inpatient with expected LOS greater than two midnights due to medical therapy. Chief Complaint: Seizure      History Of Present Illness:      38 y.o. male who presents with complaints of seizure while he was coming off work this evening. Patient [facility] by [facility]. According to the EMS patient was leaving his work at the [employer] he apparently passed out. Bystanders witnessed seizure activity. Patient does not remember the event. No urinary incontinence. History of tongue bite that happened 2 to 3 days ago according to the patient. Patient denies prior seizures. Patient drinks alcohol daily for the past 5 years, about 4-5 beers and a few shots a day. Last alcohol intake 2 days ago. History of DUI and underwent treatment few beers ago at the [facility]    Currently patient denies head or neck pain, chest or abdominal pain, nausea, vomiting, shortness of breath, palpitations. Past Medical History:      History reviewed. No pertinent past medical history. Past Surgical History:          Procedure Laterality Date    FRACTURE SURGERY  ankle       Medications Prior to Admission:      Prior to Admission medications    Not on File       Allergies:  Patient has no known allergies. Social History:    TOBACCO:   reports that he has never smoked. He has never used smokeless tobacco.  ETOH:   reports current alcohol use of about 30.0 standard drinks of alcohol per week. E-Cigarettes/Vaping Use     Questions Responses    E-Cigarette/Vaping Use     Start Date     Passive Exposure     Quit Date     Counseling Given     Comments         Family History:    Reviewed in detail and negative for DM, CAD, Cancer, CVA. Positive as follows:    History reviewed.  No pertinent family history. REVIEW OF SYSTEMS:   Pertinent positives as noted in the HPI. All other systems reviewed and negative. PHYSICAL EXAM PERFORMED:    /75   Pulse 96   Temp 97.7 °F (36.5 °C)   Resp 16   Wt 155 lb (70.3 kg)   SpO2 99%   BMI 25.79 kg/m²     General appearance:  No apparent distress, appears stated age and cooperative. Disheveled appearing, fine tremors of both hands  HEENT:  Normal cephalic, atraumatic without obvious deformity. Pupils equal, round, and reactive to light. Extra ocular muscles intact. Conjunctivae/corneas clear. Neck: Supple, with full range of motion. No jugular venous distention. Trachea midline. Respiratory:  Normal respiratory effort. Clear to auscultation, bilaterally without Rales/Wheezes/Rhonchi. Cardiovascular: Tachycardic rate and rhythm with normal S1/S2 without murmurs, rubs or gallops. Abdomen: Soft, non-tender, non-distended with normal bowel sounds. Musculoskeletal:  No clubbing, cyanosis or edema bilaterally. Full range of motion without deformity. Skin: Skin color, texture, turgor normal.  No rashes or lesions. Neurologic:  Neurovascularly intact without any focal sensory/motor deficits. Cranial nerves: II-XII intact, grossly non-focal.  Psychiatric:  Alert and oriented, thought content appropriate, normal insight  Capillary Refill: Brisk,< 3 seconds   Peripheral Pulses: +2 palpable, equal bilaterally       Labs:     Recent Labs     02/21/21  1744   WBC 4.6   HGB 12.3*   HCT 37.5*        Recent Labs     02/21/21  1744   *   K 3.3*   CL 91*   CO2 14*   BUN 4*   CREATININE 0.8*   CALCIUM 9.5     Recent Labs     02/21/21  1744   *   ALT 71*   BILITOT 1.5*   ALKPHOS 127     No results for input(s): INR in the last 72 hours. No results for input(s): Leana Greenwood in the last 72 hours.     Urinalysis:      Lab Results   Component Value Date    NITRU Negative 02/21/2021    BLOODU Negative 02/21/2021    SPECGRAV 1.010 02/21/2021 GLUCOSEU Negative 02/21/2021       Radiology:   EKG:  I have reviewed the EKG with the following interpretation: Sinus tachycardia, VR = 118, QTc = 479, no acute ST-T changes    CT Head WO Contrast   Final Result      1. No acute intracranial process. XR CHEST PORTABLE   Final Result      No acute pulmonary disease. ASSESSMENT:    Active Hospital Problems    Diagnosis Date Noted    Seizure due to alcohol withdrawal, uncomplicated ([facility] [T47.672, R56.9] 02/21/2021   #History of alcohol abuse  #Lactic acidosis  #Impending alcohol withdrawal  #Electrolyte abnormalities-hypokalemia  #Alcoholic hepatitis    PLAN:  Seizure and fall precautions  Ativan IV as needed seizures  Thiamine, folic acid, multivitamins daily  CIWA protocol with Ativan  IV hydration  Monitor LFTs, avoid hepatotoxic agents  Replace electrolytes, continue to monitor  Social service consult    DVT Prophylaxis: Lovenox  Diet: DIET GENERAL;  Code Status: Full Code    PT/OT Eval Status: Bedrest for now with seizure and fall precautions    Dispo -GMF with telemetry       Nellie Castillo MD    Thank you No primary care provider on file. for the opportunity to be involved in this patient's care. If you have any questions or concerns please feel free to contact me at [phone].

## 2021-02-22 NOTE — CARE COORDINATION
Case Management Assessment           Initial Evaluation                Date / Time of Evaluation: 2/22/2021 3:24 PM                 Assessment Completed by: Khurram Krishna    Patient Name: Oziel Yusuf     YOB: 1985  Diagnosis: Seizure due to alcohol withdrawal, uncomplicated ([facility] [A10.868, R56.9]     Date / Time: 2/21/2021  5:14 PM    Patient Admission Status: Inpatient    If patient is discharged prior to next notation, then this note serves as note for discharge by case management. Current PCP: No primary care provider on file. Clinic Patient: No    Chart Reviewed: Yes  Patient/ Family Interviewed: Yes    Initial assessment completed at bedside with: Patient    Hospitalization in the last 30 days: No    Emergency Contacts:  Extended Emergency Contact Information  Primary Emergency Contact: EbenSelene  Address: [address] [place] [address] Phone: [phone]  Relation: Parent  Secondary Emergency Contact: Selene Treadwell  Address: [address] [place] [address] Phone: [phone]  Relation: Parent    Advance Directives:   Code Status: Full [address]: No    Financial  Payor: /     Pre-cert required for SNF: No    Pharmacy    [facility] [address] [place] [address], [address] [place] [address]  Phone: [phone] Fax: [phone]      Potential assistance Purchasing Medications: Potential Assistance Purchasing Medications: No  Does Patient want to participate in local refill/ meds to beds program?: No    Meds To Beds General Rules:  1. Can ONLY be done Monday- Friday between 8:30am-5pm  2. Prescription(s) must be in pharmacy by 3pm to be filled same day  3. Copy of patient's insurance/ prescription drug card and patient face sheet must be sent along with the prescription(s)  4.  Cost of Rx cannot be added to hospital bill. If financial assistance is needed, please contact unit  or ;  or  CANNOT provide pharmacy voucher for patients co-pays  5. Patients can then  the prescription on their way out of the hospital at discharge, or pharmacy can deliver to the bedside if staff is available. (payment due at time of pick-up or delivery - cash, check, or card accepted)     Able to afford home medications/ co-pay costs: unsure, depending on cost    ADLS  Support Systems: Spouse/Significant Other, Family Members    PT AM-PAC:   /24  OT AM-PAC:   /24    [place]: home by self  Steps:      Plans to RETURN to current housing: Yes  Barriers to RETURNING to current housing: medical stability    Home Care Information  Currently ACTIVE with Lien Cuevas: No  Home Care Agency: Not Applicable    Currently ACTIVE with [place] on Aging: No  Passport/ Waiver: No  Passport/ Waiver Services: Not Applicable      Durable Medical Equipment  DME Provider:    Equipment: none    Home Oxygen and [address] [place] prior to admission: No  Judy Gutierrez 262: Not Applicable  Other Respiratory Equipment:        Dialysis  Active with HD/PD prior to admission: No  Nephrologist:      HD Center:  Not Applicable    DISCHARGE PLAN:  Disposition: Home- No Services Needed    Transportation PLAN for discharge: family     Factors facilitating achievement of predicted outcomes: Family support, Cooperative and Pleasant    Barriers to discharge: medical stability    Additional Case Management Notes:  Cm met with pt. Plans to return home at dc. May need prescription assistance at dc. Pt does not have insurance. Pt can get ride home.  Resources place on AVS.     The Plan for Transition of Care is related to the following treatment goals of Seizure due to alcohol withdrawal, uncomplicated ([facility] [W42.957, R56.9]    The Patient and/or patient representative Lawrence Gomes and his family were provided with a choice of provider and agrees with the discharge plan Not Indicated    Freedom of choice list was provided with basic dialogue that supports the patient's individualized plan of care/goals and shares the quality data associated with the providers.  Not Indicated    Care Transition patient: Alissa Rose RN  [facility] [employer]  Case Management Department  Ph: [phone]

## 2021-02-22 NOTE — PROGRESS NOTES
4 Eyes Admission Assessment     I agree as the admission nurse that 2 RN's have performed a thorough Head to Toe Skin Assessment on the patient. ALL assessment sites listed below have been assessed on admission. Areas assessed by both nurses:   [x]   Head, Face, and Ears   [x]   Shoulders, Back, and Chest  [x]   Arms, Elbows, and Hands   [x]   Coccyx, Sacrum, and Ischium  [x]   Legs, Feet, and Heels        Does the Patient have Skin Breakdown? No       Rash and excoriation of the ABD panus. Scattered bruising and scabbing.  Abrasion of the tongue d/t pt biting during seizure   Wilian Prevention initiated:  No   Wound Care Orders initiated:  No      WOC nurse consulted for Pressure Injury (Stage 3,4, Unstageable, DTI, NWPT, and Complex wounds) or Wilian score 18 or lower:  No      Nurse 1 eSignature: Electronically signed by Abram Patel RN on 2/22/21 at 12:22 AM EST    **SHARE this note so that the co-signing nurse is able to place an eSignature**    Nurse 2 eSignature: Electronically signed by Libia Fields RN on 2/22/21 at 12:21 AM EST

## 2021-02-22 NOTE — PROGRESS NOTES
Mr. Karan Boggs has improved throughout the shift. His CIWA remains low at a 2. He continues to have a headache with soreness that is aggravated by pressure such as sneezing. He is urinating well, no BM this shift.

## 2021-02-22 NOTE — PROGRESS NOTES
Pt arrived to [place] [address] from [facility]. VSS. Pt is A/Ox4. Waiting for orders from admitting hospitalist. Focused assessment shows pt has fine tremors of the arms and legs. Pt slightly sweaty. Pt denies N/V at thus time. Pt is pleasant and cooperative. Camera on for seizure precautions. Pt educated on fall, safety and seizure precautions. Bed alarm on, wheels locked, bed in lowest position, side rails up 2/4, nonskid socks on, call light and bedside table in reach. Will continue to monitor.

## 2021-02-23 VITALS
HEART RATE: 64 BPM | RESPIRATION RATE: 18 BRPM | DIASTOLIC BLOOD PRESSURE: 80 MMHG | TEMPERATURE: 98.4 F | BODY MASS INDEX: 25.83 KG/M2 | HEIGHT: 65 IN | OXYGEN SATURATION: 99 % | SYSTOLIC BLOOD PRESSURE: 116 MMHG | WEIGHT: 155 LBS

## 2021-02-23 PROBLEM — L25.9 CONTACT DERMATITIS: Status: ACTIVE | Noted: 2021-02-23

## 2021-02-23 LAB
A/G RATIO: 1.1 (ref 1.1–2.2)
ALBUMIN SERPL-MCNC: 3.1 G/DL (ref 3.4–5)
ALP BLD-CCNC: 109 U/L (ref 40–129)
ALT SERPL-CCNC: 52 U/L (ref 10–40)
ANION GAP SERPL CALCULATED.3IONS-SCNC: 11 MMOL/L (ref 3–16)
AST SERPL-CCNC: 184 U/L (ref 15–37)
BILIRUB SERPL-MCNC: 1.2 MG/DL (ref 0–1)
BUN BLDV-MCNC: <2 MG/DL (ref 7–20)
CALCIUM SERPL-MCNC: 8.5 MG/DL (ref 8.3–10.6)
CHLORIDE BLD-SCNC: 105 MMOL/L (ref 99–110)
CO2: 21 MMOL/L (ref 21–32)
CREAT SERPL-MCNC: 0.6 MG/DL (ref 0.9–1.3)
GFR AFRICAN AMERICAN: >60
GFR NON-AFRICAN AMERICAN: >60
GLOBULIN: 2.9 G/DL
GLUCOSE BLD-MCNC: 84 MG/DL (ref 70–99)
POTASSIUM REFLEX MAGNESIUM: 3.8 MMOL/L (ref 3.5–5.1)
SODIUM BLD-SCNC: 137 MMOL/L (ref 136–145)
TOTAL PROTEIN: 6 G/DL (ref 6.4–8.2)

## 2021-02-23 PROCEDURE — 80053 COMPREHEN METABOLIC PANEL: CPT

## 2021-02-23 PROCEDURE — 6370000000 HC RX 637 (ALT 250 FOR IP): Performed by: INTERNAL MEDICINE

## 2021-02-23 PROCEDURE — 2580000003 HC RX 258: Performed by: INTERNAL MEDICINE

## 2021-02-23 PROCEDURE — 36415 COLL VENOUS BLD VENIPUNCTURE: CPT

## 2021-02-23 PROCEDURE — 6360000002 HC RX W HCPCS: Performed by: INTERNAL MEDICINE

## 2021-02-23 RX ORDER — BENZOCAINE/MENTHOL 6 MG-10 MG
LOZENGE MUCOUS MEMBRANE 2 TIMES DAILY
Qty: 1 TUBE | Refills: 0 | Status: SHIPPED | OUTPATIENT
Start: 2021-02-23 | End: 2021-03-05

## 2021-02-23 RX ORDER — PANTOPRAZOLE SODIUM 40 MG/1
40 TABLET, DELAYED RELEASE ORAL
Qty: 30 TABLET | Refills: 0 | Status: ON HOLD | OUTPATIENT
Start: 2021-02-23 | End: 2022-04-30

## 2021-02-23 RX ORDER — FOLIC ACID 1 MG/1
1 TABLET ORAL DAILY
Qty: 30 TABLET | Refills: 0 | Status: ON HOLD | OUTPATIENT
Start: 2021-02-24 | End: 2022-04-30

## 2021-02-23 RX ORDER — MULTIVITAMIN WITH IRON
1 TABLET ORAL DAILY
Qty: 30 TABLET | Refills: 0 | Status: ON HOLD | OUTPATIENT
Start: 2021-02-24 | End: 2022-04-30

## 2021-02-23 RX ORDER — GAUZE BANDAGE 2" X 2"
100 BANDAGE TOPICAL DAILY
Qty: 15 TABLET | Refills: 0 | Status: ON HOLD | OUTPATIENT
Start: 2021-02-24 | End: 2022-04-30

## 2021-02-23 RX ADMIN — THIAMINE HCL TAB 100 MG 100 MG: 100 TAB at 08:13

## 2021-02-23 RX ADMIN — Medication 10 ML: at 08:16

## 2021-02-23 RX ADMIN — HYDROCORTISONE: 0.01 CREAM TOPICAL at 08:14

## 2021-02-23 RX ADMIN — ENOXAPARIN SODIUM 40 MG: 40 INJECTION SUBCUTANEOUS at 08:14

## 2021-02-23 RX ADMIN — FOLIC ACID 1 MG: 1 TABLET ORAL at 08:13

## 2021-02-23 RX ADMIN — THERA TABS 1 TABLET: TAB at 08:13

## 2021-02-23 NOTE — PLAN OF CARE
Falls - Risk of:  Goal: Absence of physical injury  Outcome: Ongoing  Note: Mr. Mikaela Gorman is waiting for assistance when he desires to ambulate. He is walking independently. Coping:  Goal: Ability to identify appropriate support needs will improve  Outcome: Ongoing     Health Behavior:  Goal: Ability to manage health-related needs will improve  Outcome: Ongoing  Note: I have educated Mr. Mikaela Gorman on the purpose of supporting his nervous system with the vitamin/mineral supplementation.

## 2021-02-23 NOTE — CARE COORDINATION
Case Management Assessment            Discharge Note                    Date / Time of Note: 2/23/2021 2:23 PM                  Discharge Note Completed by: Edgar Law    Patient Name: Fausto Ramsey   YOB: 1985  Diagnosis: Seizure due to alcohol withdrawal, uncomplicated ([facility] [address] [V49.250, R56.9]   Date / Time: 2/21/2021  5:14 PM    Current PCP: No primary care provider on file. Clinic patient: No    Hospitalization in the last 30 days: No    Advance Directives:  Code Status: Prior  [place] DNR form completed and on chart: Not Indicated    Financial:  Payor: /      Pharmacy:    [facility] [address], [address] [place] [address]  Phone: [phone] Fax: [phone]      Assistance purchasing medications?: Potential Assistance Purchasing Medications: No  Assistance provided by Case Management: None at this time    Does patient want to participate in local refill/ meds to beds program?: No    Meds To Beds General Rules:  1. Can ONLY be done Monday- Friday between 8:30am-5pm  2. Prescription(s) must be in pharmacy by 3pm to be filled same day  3. Copy of patient's insurance/ prescription drug card and patient face sheet must be sent along with the prescription(s)  4. Cost of Rx cannot be added to hospital bill. If financial assistance is needed, please contact unit  or ;  or  CANNOT provide pharmacy voucher for patients co-pays  5.  Patients can then  the prescription on their way out of the hospital at discharge, or pharmacy can deliver to the bedside if staff is available. (payment due at time of pick-up or delivery - cash, check, or card accepted)     Able to afford home medications/ co-pay costs: Yes    ADLS:  Current PT AM-PAC Score:   /24  Current OT AM-PAC Score:   /24      DISCHARGE Disposition: Home- No Services Needed    LOC at discharge: Not Applicable  DANIA Completed: Yes    Notification completed in HENS/PAS?:  Not Applicable    IMM Completed:   Not Indicated    Transportation:  Transportation PLAN for discharge: family   Mode of Transport: Private Car  Reason for medical transport: Not Applicable  Name of [facility] [address]: Not Applicable  Time of Transport: when available    Transport form completed: Not Indicated    Home Care:  1 Erica Meeks ordered at discharge: Not [address]: Not Applicable  Orders faxed: No    Durable Medical Equipment:  DME Provider:   Equipment obtained during hospitalization:     Home Oxygen and Respiratory Equipment:  Oxygen needed at discharge?: Not [address]: Not Applicable  Portable tank available for discharge?: Not Indicated    Dialysis:  Dialysis patient: No    Dialysis Center:  Not Applicable    Hospice Services:  Location: Not Applicable  Agency: Not Applicable    Consents signed: Not Indicated    Referrals made at [facility] for outpatient continued care:  Not Applicable    Additional CM Notes:   Patient discharging to home. Family to transport. Patient declining any rehab services. Resources given to patient at bedside. Patient denied the need for any HHC or having any questions or concerns regarding discharge plans. No other needs from CM. Alcohol and Substance Abuse Community Resources:    Information and Referrals:  [address] [place] [address] 24 hours/ 7days - 4601 [facility] (24/7 hotline)- [phone](); [phone] [facility])  Barbra Russell;  [address] [place] [address] (Treatment consultant) - [phone]  [email or web address]) or email: Deloris Bardales, [address] [address] MEDCENTER Coordinator) - [phone]   213 [facility] Room 202(enter on St. [place], [address]  5510 [facility] (www.[facility]. org)- for [facility] - [phone]  Hakan Cisneros/ [employer] - [phone]  (www.[facility]. org)   Narcotics Anonymous (www.Apaja[employer]eds [employer])  - [phone]    Suicide Hotlines: toll free and available 24/7  861-877-CARE (33) 1632 5256)  3-247-UWMWGZS (2-352.872.5179)  4-572-978-TALK (7-845-510-DYTF) - [facility] [place]  TTY: 8-913-026-4GPG    Detoxification Services/ Inpatient Treatment/ Residential Treatment Programs:  [place] - [phone]    1460 [facility], [address] [place] [address] for [address] ([address] [place] ) - [phone] [address], [address]  [facility] Board - [phone]  [facility], [address] [address]) - [phone] ext.  Marcello Eng  [address] [facility] [address] or 2-945-981-[facility]  [address] [facility] [address] [place], [address] [facility] [address]  [facility] (www.[employer]) - [phone]  [facility] [address] - [phone]   301 [facility] [address]  706 [facility] - [phone]  Estrela [address] [place], [address] Ry Cavazos. (residential, outpatient) - [phone]  [place]in [place], 600 [facility] - [phone]  [address] [place] [address] - [phone] (must be [facility] resident)   3531 [facility], [facility]7 [facility] [phone]     Crisis or Emergency Needs - [phone]- CARE ([phone]) available 24/7   4700 [facility], 20[address] [facility]     Inpatient/ Residential Treatment Just for Men:  525 [facility] Res[facility] Treatment - [phone]  2208 [facility] [address] [place] [address] [place] (www.[facility]. B[place] Baba) - [phone]  34 [facility], [address] (Men)     Inpatient/ Residential Treatment Just for Women:  [address] [place] (www.[facility]. org) - [phone]  [address] (women)  [facility]: Dania Nazario (pregnant &  treatment) - [phone]  301 [facility] [address] Drs - [phone] (must be [facility] HEALTH resident;  services)   3531 [facility], [address] [place] [address] 99 - [phone] (Pregnant and  treatment)   1201 [facility] [place] [facility], 2301 [facility]    Outpatient Treatment Services:  Alejandra Cooper and [address] [place] [address] for Men - [phone]   1440 [facility], 195 [facility] - 103 [facility]. [address] 60  [facility]- [phone]  [address] [place], [address] [place] [address] Alcohol and Drug Treatment Program WALESKA FIELDS [facility] location) - [phone]  601 [facility], 2301 [facility] (4th floor Hailee Prater)  [place] Alcohol and Drug Treatment Program [facility] Location) - [phone]  5000 [facility] [address] [place], [address] [facility] [address] for [address] ([address]) - [phone]  [address], [address]  HOSP [facility] - [phone]   [address] [place], [address]  3100 [facility] (www.Ter[employer])  - [phone]  [address], [address] [place] [address] & HOSPITAL - [phone] [address] , 55 [facility]  [address] - [phone]  [address] [facility] [address] [place], [address] S [facility] Services - [phone]   [address] [place] [address], [address] [place] [address] & HOSPITAL - [phone]   [address], 4300 [facility]  - [address] [place], [address] [facility] [address]  [address]   [address], 400 [facility] Substance Abuse - [address] [place], [address]  [address] , [address]  [address]    Methadone/ [facility] Suboxone - [phone]   576 [facility], [address]  [address] - [phone]   2520 [address] [facility]  385 [facility]   7600 [place], [address] (use lower level entrance)  150 [facility] [phone]   2601 [facility] 20201 [facility] - [phone]   3531 [facility], [address] [place] [address]  Martina Crooks (www.[employer])  - [phone]  214 [facility] 1111 [address] [place] [address], 590 [facility] - [phone]   [address]  Self Help Resources:  Alcoholics Anonymous Hotline (www.[place].org)  (24 hours/ 7days) - 468.807.1021    The Plan for Transition of Care is related to the following treatment goals of Seizure due to alcohol withdrawal, uncomplicated ([facility] [address] [F93.741, R56.9]    The Patient and/or patient representative Ju Ragland and his family were provided with a choice of provider and agrees with the discharge plan Yes    Freedom of choice list was provided with basic dialogue that supports the patient's individualized plan of care/goals and shares the quality data associated with the providers.  Yes    Care Transitions patient: No    Yancy Parra RN  [facility] [employer]  Case Management Department  Ph: [phone]  Fax: [phone]

## 2021-02-23 NOTE — PROGRESS NOTES
Mr. Erma Woodruff is resting comfortably. He denies nausea this morning and is not as diaphoretic as yesterday. 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

## 2021-02-23 NOTE — PLAN OF CARE
Problem: Falls - Risk of:  Goal: Will remain free from falls  Description: Will remain free from falls  Outcome: Ongoing  Note: Fall precautions in place. Bed is in lowest position, wheels locked and alarm on. Non-skid socks on. Call light and bedside table within reach. Pt calls out appropriately. Pt is up x 1 standby assist. Patient is on camera due to seizure precautions. Will continue to assess and monitor. Problem: Physical Regulation:  Goal: Ability to maintain a stable neurologic state will improve  Description: Ability to maintain a stable neurologic state will improve  2/23/2021 0146 by Sunita Richards RN  Outcome: Ongoing  Note: Patient's neuro exams remain unchanged. He is alert and oriented x 4. Will continue to monitor.

## 2021-02-23 NOTE — PROGRESS NOTES
Reviewed discharge paperwork with Mr. Lamonte Ojeda. Educated on vitamins to take and their purposes, as well as reviewed appropriate social support services, specifically the [address] to provide 1 on 1 counseling.

## 2021-02-23 NOTE — DISCHARGE SUMMARY
Hospital Medicine Discharge Summary    Patient ID: Xenia Perez      Patient's PCP: No primary care provider on file. Admit Date: 2/21/2021     Discharge Date: 2/23/2021    Admitting Physician: Kalani Carty MD     Discharge Physician: Smita Lazcano MD    Discharge Diagnoses: Active Hospital Problems    Diagnosis Date Noted    Contact dermatitis [L25.9] 02/23/2021    Seizure due to alcohol withdrawal, uncomplicated ([facility] [P56.219, R56.9] 02/21/2021     The patient was seen and examined on day of discharge and this discharge summary is in conjunction with any daily progress note from day of discharge. Hospital Course: Xenia Perez is a 38 y.o. male with h/o alcohol abuse who presented to [facility] ED on 2/21 after an episode of syncope. He was leaving work when he passed out and was witnessed to have seizure activity. He did not have urinary or fecal incontinence or tongue biting. He has a history of daily alcohol consumption (5-6 beers daily + shots of hard liquor) and had not had a drink in 24 hours at the time of the episode. His blood glucose on scene was 166. In the ED, he was hemodynamically stable except for tachycardia. He was postictal according to ED physician. He was also mildly tremulous but did not have any focal neurological findings. Initial CIWA was 4. He received 1 dose of Ativan and was started on IV fluids. Also given thiamine. Labs were significant for Na 132, K 3.3, Cl 91, bicarb 14, anion gap 27. Lactic acid was elevated at 11.9 and liver panel showed total bili 1.5, ALT 71, . Throughout hospitalization, he reported some anxiety, nausea, sweating, and was found to be mildly tremulous but did not have any additional seizures or syncopal episodes. CIWA improved to 1-2 by time of discharge. He did not require any additional Ativan. He was provided counseling and resources regarding alcohol cessation.  He was also counseled on seizure precautions at discharge. Patient was evaluated at time of discharge complaining of some stomach upset. Denies N/V/F/C. CIWA score persistently low. Stable to be discharged. Final Dx. #Seizure due to alcohol intoxication. Do not want any AED as it was first episode. #Alcohol intoxication with withdrawal  #Contact dermatitis at abdominal wall. Physical Exam Performed:   /80   Pulse 64   Temp 98.4 °F (36.9 °C) (Oral)   Resp 18   Ht 5' 5\" (1.651 m)   Wt 155 lb (70.3 kg)   SpO2 99%   BMI 25.79 kg/m²      General appearance:  No apparent distress, appears stated age and cooperative. HEENT:  Normal cephalic, atraumatic without obvious deformity. Pupils equal, round, and reactive to light. Extra ocular muscles intact. Conjunctivae/corneas clear. Neck: Supple, with full range of motion. No jugular venous distention. Trachea midline. Respiratory:  Normal respiratory effort. Clear to auscultation, bilaterally without Rales/Wheezes/Rhonchi. Cardiovascular:  Regular rate and rhythm with normal S1/S2 without murmurs, rubs or gallops. Abdomen: Soft, non-tender, non-distended with normal bowel sounds. Musculoskeletal:  No clubbing, cyanosis or edema bilaterally. Skin: Skin color, texture, turgor normal.  There is an area of thickened and dry skin on waistband area of abdomen. Neurologic:  Neurovascularly intact without any focal sensory/motor deficits. Cranial nerves: II-XII intact, grossly non-focal. Mild resting tremor of bilateral hands. Psychiatric:  Alert and oriented,   Capillary Refill: Brisk,< 3 seconds   Peripheral Pulses: +2 palpable, equal bilaterally     Labs:  For convenience and continuity at follow-up the following most recent labs are provided:    CBC:    Lab Results   Component Value Date    WBC 3.3 02/22/2021    HGB 10.8 02/22/2021    HCT 32.5 02/22/2021    PLT 86 02/22/2021     Renal:    Lab Results   Component Value Date     02/23/2021    K 3.8 02/23/2021     02/23/2021 CO2 21 02/23/2021    BUN <2 02/23/2021    CREATININE 0.6 02/23/2021    CALCIUM 8.5 02/23/2021     Significant Diagnostic Studies    Radiology:   CT Head WO Contrast   Final Result      1. No acute intracranial process. XR CHEST PORTABLE   Final Result      No acute pulmonary disease. Consults:     IP CONSULT TO SOCIAL WORK  IP CONSULT TO SOCIAL WORK    Disposition:  Discharge to home      Condition at Discharge: Stable    Discharge Instructions/Follow-up:    - Provided patient with resources for alcohol cessation/rehab  - Counseled patient on seizure precautions: he is not to drive for 4-6 months or until cleared by his physician, advised not to operate heavy machinery  - Follow-up with PCP in 1 week  - Prescribed hydrocortisone cream to apply to contact dermatitis for 7-10 days   - Prescribed Protonix for nausea/abdominal pain   - Prescribed folic acid and multivitamin to prevent nutritional deficiencies in setting of alcohol use    Code Status:  Full Code     Activity: activity as tolerated    Diet: regular diet    Discharge Medications:   Current Discharge Medication List           Details   hydrocortisone-aloe 1 % CREA cream Apply topically 2 times daily for 10 days  Qty: 1 Tube, Refills: 0      folic acid (FOLVITE) 1 MG tablet Take 1 tablet by mouth daily  Qty: 30 tablet, Refills: 0      Multiple Vitamin (MULTIVITAMIN) TABS tablet Take 1 tablet by mouth daily  Qty: 30 tablet, Refills: 0      thiamine mononitrate 100 MG tablet Take 1 tablet by mouth daily for 15 days  Qty: 15 tablet, Refills: 0      pantoprazole (PROTONIX) 40 MG tablet Take 1 tablet by mouth every morning (before breakfast)  Qty: 30 tablet, Refills: 0           Time Spent on discharge is more than 15 minutes in the examination, evaluation, counseling and review of medications and discharge plan. I have seen and examined patient independently. I have discussed plan of care and reviewed note done by medical student.   Scribed by Nava Delgadillo, MS4    [facility]  Attending Physician    Signed:    Nava Delgadillo   2/23/2021      Thank you No primary care provider on file. for the opportunity to be involved in this patient's care. If you have any questions or concerns please feel free to contact me at [phone].

## 2021-11-21 ENCOUNTER — APPOINTMENT (OUTPATIENT)
Dept: GENERAL RADIOLOGY | Age: 36
End: 2021-11-21
Payer: OTHER GOVERNMENT

## 2021-11-21 ENCOUNTER — HOSPITAL ENCOUNTER (EMERGENCY)
Age: 36
Discharge: HOME | End: 2021-11-21
Payer: OTHER GOVERNMENT

## 2021-11-21 VITALS
HEART RATE: 90 BPM | SYSTOLIC BLOOD PRESSURE: 124 MMHG | TEMPERATURE: 98.4 F | OXYGEN SATURATION: 99 % | DIASTOLIC BLOOD PRESSURE: 80 MMHG | RESPIRATION RATE: 16 BRPM

## 2021-11-21 DIAGNOSIS — U07.1 COVID-19: Primary | ICD-10-CM

## 2021-11-21 DIAGNOSIS — E87.1 HYPONATREMIA: ICD-10-CM

## 2021-11-21 LAB
A/G RATIO: 1 (ref 1.1–2.2)
ALBUMIN SERPL-MCNC: 3.7 G/DL (ref 3.4–5)
ALP BLD-CCNC: 104 U/L (ref 40–129)
ALT SERPL-CCNC: 54 U/L (ref 10–40)
ANION GAP SERPL CALCULATED.3IONS-SCNC: 16 MMOL/L (ref 3–16)
AST SERPL-CCNC: 247 U/L (ref 15–37)
BASOPHILS ABSOLUTE: 0 K/UL (ref 0–0.2)
BASOPHILS RELATIVE PERCENT: 1.2 %
BILIRUB SERPL-MCNC: 0.4 MG/DL (ref 0–1)
BUN BLDV-MCNC: 6 MG/DL (ref 7–20)
CALCIUM SERPL-MCNC: 8.5 MG/DL (ref 8.3–10.6)
CHLORIDE BLD-SCNC: 90 MMOL/L (ref 99–110)
CO2: 20 MMOL/L (ref 21–32)
CREAT SERPL-MCNC: 0.7 MG/DL (ref 0.9–1.3)
EOSINOPHILS ABSOLUTE: 0 K/UL (ref 0–0.6)
EOSINOPHILS RELATIVE PERCENT: 0.3 %
GFR AFRICAN AMERICAN: >60
GFR NON-AFRICAN AMERICAN: >60
GLUCOSE BLD-MCNC: 116 MG/DL (ref 70–99)
HCT VFR BLD CALC: 41 % (ref 40.5–52.5)
HEMOGLOBIN: 13.3 G/DL (ref 13.5–17.5)
LYMPHOCYTES ABSOLUTE: 0.4 K/UL (ref 1–5.1)
LYMPHOCYTES RELATIVE PERCENT: 15.9 %
MAGNESIUM: 1.8 MG/DL (ref 1.8–2.4)
MCH RBC QN AUTO: 27.6 PG (ref 26–34)
MCHC RBC AUTO-ENTMCNC: 32.6 G/DL (ref 31–36)
MCV RBC AUTO: 84.7 FL (ref 80–100)
MONOCYTES ABSOLUTE: 0.5 K/UL (ref 0–1.3)
MONOCYTES RELATIVE PERCENT: 20.7 %
NEUTROPHILS ABSOLUTE: 1.4 K/UL (ref 1.7–7.7)
NEUTROPHILS RELATIVE PERCENT: 61.9 %
PDW BLD-RTO: 24.1 % (ref 12.4–15.4)
PLATELET # BLD: 76 K/UL (ref 135–450)
PLATELET SLIDE REVIEW: ABNORMAL
PMV BLD AUTO: 7.5 FL (ref 5–10.5)
POTASSIUM REFLEX MAGNESIUM: 3.2 MMOL/L (ref 3.5–5.1)
RAPID INFLUENZA  B AGN: NEGATIVE
RAPID INFLUENZA A AGN: NEGATIVE
RBC # BLD: 4.84 M/UL (ref 4.2–5.9)
SARS-COV-2, NAAT: DETECTED
SLIDE REVIEW: ABNORMAL
SODIUM BLD-SCNC: 126 MMOL/L (ref 136–145)
TOTAL PROTEIN: 7.4 G/DL (ref 6.4–8.2)
WBC # BLD: 2.3 K/UL (ref 4–11)

## 2021-11-21 PROCEDURE — 87635 SARS-COV-2 COVID-19 AMP PRB: CPT

## 2021-11-21 PROCEDURE — 2580000003 HC RX 258: Performed by: PHYSICIAN ASSISTANT

## 2021-11-21 PROCEDURE — 80053 COMPREHEN METABOLIC PANEL: CPT

## 2021-11-21 PROCEDURE — 6370000000 HC RX 637 (ALT 250 FOR IP): Performed by: PHYSICIAN ASSISTANT

## 2021-11-21 PROCEDURE — 71045 X-RAY EXAM CHEST 1 VIEW: CPT

## 2021-11-21 PROCEDURE — 83735 ASSAY OF MAGNESIUM: CPT

## 2021-11-21 PROCEDURE — 99285 EMERGENCY DEPT VISIT HI MDM: CPT

## 2021-11-21 PROCEDURE — 85025 COMPLETE CBC W/AUTO DIFF WBC: CPT

## 2021-11-21 PROCEDURE — 87804 INFLUENZA ASSAY W/OPTIC: CPT

## 2021-11-21 RX ORDER — 0.9 % SODIUM CHLORIDE 0.9 %
1000 INTRAVENOUS SOLUTION INTRAVENOUS ONCE
Status: COMPLETED | OUTPATIENT
Start: 2021-11-21 | End: 2021-11-21

## 2021-11-21 RX ORDER — BENZONATATE 100 MG/1
100 CAPSULE ORAL 3 TIMES DAILY PRN
Qty: 30 CAPSULE | Refills: 0 | Status: ON HOLD | OUTPATIENT
Start: 2021-11-21 | End: 2022-04-30

## 2021-11-21 RX ORDER — ONDANSETRON 4 MG/1
4 TABLET, ORALLY DISINTEGRATING ORAL EVERY 8 HOURS PRN
Qty: 21 TABLET | Refills: 0 | Status: ON HOLD | OUTPATIENT
Start: 2021-11-21 | End: 2022-04-30

## 2021-11-21 RX ORDER — ONDANSETRON 4 MG/1
4 TABLET, ORALLY DISINTEGRATING ORAL ONCE
Status: COMPLETED | OUTPATIENT
Start: 2021-11-21 | End: 2021-11-21

## 2021-11-21 RX ADMIN — ONDANSETRON 4 MG: 4 TABLET, ORALLY DISINTEGRATING ORAL at 14:11

## 2021-11-21 RX ADMIN — SODIUM CHLORIDE 1000 ML: 9 INJECTION, SOLUTION INTRAVENOUS at 14:26

## 2021-11-21 RX ADMIN — POTASSIUM BICARBONATE 40 MEQ: 782 TABLET, EFFERVESCENT ORAL at 14:10

## 2021-11-21 NOTE — ED NOTES
Pt continues resting in bed, no change in condition. Pt denies current needs at this time. Call light remains in reach.      Makayla Lynn RN  11/21/21 9447

## 2021-11-21 NOTE — ED NOTES
Educated pt on x2 prescriptions and discharge paperwork as well as follow-up appointment. Pt verbalizes understanding of all instructions and denies questions. IV discontinued, catheter intact, minimal bleeding at IV site, 2x2 and tape applied, manual pressure held. Pt left ambulatory by self with all personal belongings, and discharge paperwork to private residence. Pt in no distress at this time. Prescriptions x2 sent as E-Scripts. Pt instructed on how to  prescriptions. Pt verbalizes understanding.          Brandie Barnes RN  11/21/21 3169

## 2021-11-21 NOTE — ED PROVIDER NOTES
[facility] Emergency Department    CHIEF COMPLAINT  Concern For COVID-19 (congestion, fever at home, aches)      SHARED SERVICE VISIT  Evaluated by SERGEI. My supervising physician was available for consultation. HISTORY OF PRESENT ILLNESS  Yousif Gomez is a 43 y.o. male who presents to the ED complaining of congestion, body aches, cough as well as fevers at home. Patient states this is been ongoing for the past week and a half and has not improved much. Patient states that he works at [employer] and is to use concerned because at his place of work nobody wears any masks. Patient is unvaccinated due to concerns about the cost.  Does admit to alcohol use. No diarrhea. No abdominal pain. Patient states decreased appetite and decreased oral intake over the past few days. She has not taken any medication to help with symptoms. No pleuritic chest pain or exertional symptoms. No other complaints, modifying factors or associated symptoms. Nursing notes reviewed. History reviewed. No pertinent past medical history. Past Surgical History:   Procedure Laterality Date    FRACTURE SURGERY  ankle     History reviewed. No pertinent family history. Social History     Socioeconomic History    Marital status: Single     Spouse name: Not on file    Number of children: Not on file    Years of education: Not on file    Highest education level: Not on file   Occupational History    Not on file   Tobacco Use    Smoking status: Never Smoker    Smokeless tobacco: Never Used   Substance and Sexual Activity    Alcohol use:  Yes     Alcohol/week: 30.0 standard drinks     Types: 30 Standard drinks or equivalent per week     Comment: \"depends on the day\"    Drug use: No    Sexual activity: Not on file   Other Topics Concern    Not on file   Social History Narrative    Not on file     Social Determinants of Health     Financial Resource Strain:     Difficulty of Paying Living Expenses: Not on file   Food Insecurity:     Worried About [address] in the Last Year: Not on file    Stanley of Food in the Last Year: Not on file   Transportation Needs:     Lack of Transportation (Medical): Not on file    Lack of Transportation (Non-Medical):  Not on file   Physical Activity:     Days of Exercise per Week: Not on file    Minutes of Exercise per Session: Not on file   Stress:     Feeling of Stress : Not on file   Social Connections:     Frequency of Communication with Friends and Family: Not on file    Frequency of Social Gatherings with Friends and Family: Not on file    Attends Episcopal Services: Not on file    Active Member of [employer] or Organizations: Not on file    Attends Club or Organization Meetings: Not on file    Marital Status: Not on file   Intimate Partner Violence:     Fear of Current or Ex-Partner: Not on file    Emotionally Abused: Not on file    Physically Abused: Not on file    Sexually Abused: Not on file   Housing Stability:     Unable to Pay for Housing in the Last Year: Not on file    Number of [place] in the Last Year: Not on file    Unstable Housing in the Last Year: Not on file     Current Facility-Administered Medications   Medication Dose Route Frequency Provider Last Rate Last Admin    0.9 % sodium chloride bolus  1,000 mL IntraVENous Once Chauncey Chamberlain PA-C 1,000 mL/hr at 11/21/21 1426 1,000 mL at 11/21/21 1426    potassium bicarb-citric acid (EFFER-K) effervescent tablet 40 mEq  40 mEq Oral Daily Chauncey Chamberlain PA-C   40 mEq at 11/21/21 1410     Current Outpatient Medications   Medication Sig Dispense Refill    benzonatate (TESSALON PERLES) 100 MG capsule Take 1 capsule by mouth 3 times daily as needed for Cough 30 capsule 0    ondansetron (ZOFRAN ODT) 4 MG disintegrating tablet Take 1 tablet by mouth every 8 hours as needed for Nausea or Vomiting 21 tablet 0    folic acid (FOLVITE) 1 MG tablet Take 1 tablet by mouth daily 30 tablet 0    Multiple Vitamin (MULTIVITAMIN) TABS tablet Take 1 tablet by mouth daily 30 tablet 0    thiamine mononitrate 100 MG tablet Take 1 tablet by mouth daily for 15 days 15 tablet 0    pantoprazole (PROTONIX) 40 MG tablet Take 1 tablet by mouth every morning (before breakfast) 30 tablet 0     No Known Allergies    REVIEW OF SYSTEMS  10 systems reviewed, pertinent positives per HPI otherwise noted to be negative    PHYSICAL EXAM  /78   Pulse 79   Temp 98.4 °F (36.9 °C)   Resp 14   SpO2 99%   GENERAL APPEARANCE: Awake and alert. Cooperative. HEAD: Normocephalic. Atraumatic. EYES: EOM's grossly intact. ENT: Mucous membranes are moist.   NECK: Supple. HEART: RRR. No murmurs. LUNGS: Respirations unlabored. CTAB. Good air exchange. Speaking comfortably in full sentences. EXTREMITIES: No peripheral edema. Moves all extremities equally. All extremities neurovascularly intact. SKIN: Warm and dry. No acute rashes. NEUROLOGICAL: Alert and oriented. CN's 2-12 intact. No gross facial drooping. Strength 5/5, sensation intact. PSYCHIATRIC: Normal mood and affect.     RADIOLOGY  XR CHEST PORTABLE   Final Result   Right upper lobe pneumonia             LABS  Labs Reviewed   COVID-19, RAPID - Abnormal; Notable for the following components:       Result Value    SARS-CoV-2, NAAT DETECTED (*)     All other components within normal limits    Narrative:     Allen Mayes tel. [phone],  Microbiology results called to and read back by Andressa Portillo MD, 11/21/2021  13:07, by William Pak  Performed at:  [address],  [place], Formerly named [facility] [place]   Phone [phone]   CBC WITH AUTO DIFFERENTIAL - Abnormal; Notable for the following components:    WBC 2.3 (*)     Hemoglobin 13.3 (*)     RDW 24.1 (*)     Platelets 76 (*)     Neutrophils Absolute 1.4 (*)     Lymphocytes Absolute 0.4 (*)     All other components within normal limits    Narrative: Performed at:  [address],  [place], [address]   Phone [phone]   COMPREHENSIVE METABOLIC PANEL W/ REFLEX TO MG FOR LOW K - Abnormal; Notable for the following components:    Sodium 126 (*)     Potassium reflex Magnesium 3.2 (*)     Chloride 90 (*)     CO2 20 (*)     Glucose 116 (*)     BUN 6 (*)     CREATININE 0.7 (*)     Albumin/Globulin Ratio 1.0 (*)     ALT 54 (*)      (*)     All other components within normal limits    Narrative:     Performed at:  [address],  [place], [address]   Phone [phone]   RAPID INFLUENZA A/B ANTIGENS    Narrative:     Performed at:  [address],  [place], [address]   Phone [phone]   MAGNESIUM    Narrative:     Performed at:  [address],  [place], [address]   Phone [phone]       PROCEDURES  Unless otherwise noted below, none  Procedures    MDM  MDM  38-year-old male presents ED for concerns for COVID-19.  1/2 weeks of generalized body aches fatigue decreased appetite decreased oral intake. No pleuritic chest pain or exertional chest pain. No prior history of cardiac or respiratory disease. On arrival vitals were within normal menses afebrile heart rate 99 which did resolve to 79. He is afebrile. Lab work demonstrated positive COVID-19 as expected. Laboratory work-up was obtained which demonstrated hyponatremia 126 patient was started on 1 L normal 9% saline. Potassium 3.2 and he was given 40 mEq of oral potassium. BUN/creatinine 6/0.74 given IV fluids. Discussed with patient that he does drink alcohol as this could contribute to his hyponatremia. CBC demonstrated leukopenia at 2.3 which is frequently seen with COVID-19 viral infection.   To the patient he should quarantine for 10 days from the onset of his symptoms and not return to work

## 2021-11-21 NOTE — LETTER
[facility]  ED  [address] [address]  Phone: [phone]  Fax: [phone]               November 21, 2021    Patient: Noemi Garcia   YOB: 1985   Date of Visit: 11/21/2021       To Whom It May Concern:    Noemi Garcia was seen and treated in our emergency department on 11/21/2021. He may return to work on 11/28/2021.       Sincerely,       Eulalia Paez RN         Signature:__________________________________

## 2021-11-22 ENCOUNTER — CARE COORDINATION (OUTPATIENT)
Dept: CARE COORDINATION | Age: 36
End: 2021-11-22

## 2021-11-22 NOTE — CARE COORDINATION
Patient contacted regarding COVID-19 diagnosis and pulse oximeter ordered at discharge. Discussed COVID-19 related testing which was available at this time. Test results were positive. Patient informed of results, if available? Yes. Ambulatory Care Manager contacted the patient by telephone to perform post discharge assessment. Call within 2 business days of discharge: Yes. Verified name and  with patient as identifiers. Provided introduction to self, and explanation of the CTN/ACM role, and reason for call due to risk factors for infection and/or exposure to COVID-19. Symptoms reviewed with patient who verbalized the following symptoms: fatigue, pain or aching joints, cough, sweating, no new symptoms, no worsening symptoms and congestion. Due to no new or worsening symptoms encounter was not routed to provider for escalation. Discussed follow-up appointments. If no appointment was previously scheduled, appointment scheduling offered: No.  [facility] follow up appointment(s): No future appointments. Non-[facility] follow up appointment(s): n/a    Non-face-to-face services provided:  Obtained and reviewed discharge summary and/or continuity of care documents     Advance Care Planning:   Does patient have an Advance Directive:  decision maker updated. Educated patient about risk for severe COVID-19 due to risk factors according to CDC guidelines. ACM reviewed discharge instructions, medical action plan and red flag symptoms with the patient who verbalized understanding. Discussed COVID vaccination status: Yes. Education provided on COVID-19 vaccination as appropriate. Discussed exposure protocols and quarantine with CDC Guidelines. Patient was given an opportunity to verbalize any questions and concerns and agrees to contact ACM or health care provider for questions related to their healthcare.     Reviewed and educated patient on any new and changed medications related to discharge diagnosis     Was patient discharged with a pulse oximeter? Yes Discussed and confirmed pulse oximeter discharge instructions and when to notify provider or seek emergency care. [facility] provided contact information. Plan for follow-up call in 5-7 days based on severity of symptoms and risk factors. Summary  Outbound call made to patient d/t recent ED visit. Reviewed discharge instructions regarding medication, follow up PCP, and S/S of when to return to the ED. Patient said that his prescriptions were sent to the wrong pharmacy. Patient said is going to call and have them transferred. Patient currently does not have a PCP. [facility] provided patient with the number to [facility]) Pre-Services (Find A Doc). Patient said is feeling a little better and denies SOB or chest pain. M educated patient on s/s to return to ED for ex: SOB that is persistent or gets worse, constant chest pain or pressure, leg or calf pain, swelling in legs, fever that does not go down with medications, new or worsening of symptoms, patient voiced understanding. [facility] patient with the [address] AC encouraged patient to drink plenty of fluid and rest. Patient denies having any other questions are concerns currently. Patient given [facility]'s contact information and is encouraged to call with any questions or concerns.         Plan   F/U on s/s with patient   Any needs or concerns    [facility]  [phone]  [address]  [address] [place] [address] Primary Care

## 2021-11-29 ENCOUNTER — CARE COORDINATION (OUTPATIENT)
Dept: CARE COORDINATION | Age: 36
End: 2021-11-29

## 2021-11-29 NOTE — CARE COORDINATION
You Patient resolved from the Care Transitions episode on 11/29/21  Discussed COVID-19 related testing which was available at this time. Test results were positive. Patient informed of results, if available? Yes    Patient/family has been provided the following resources and education related to COVID-19:                         Signs, symptoms and red flags related to COVID-19            CDC exposure and quarantine guidelines            Contact for their local Department of Health                  Patient currently reports that the following symptoms have improved:  Patient said symptoms have resolved     Summary   Outbound call made to patient to follow since last contact. Patient said the symptoms have resolved and he is back at work. Patient denies having any other questions are concerns currently. Patient has ACM's contact information and is encouraged to call with any questions or concerns. No further outreach scheduled with this CTN/ACM. Episode of Care resolved. Patient has this CTN/ACM contact information if future needs arise.

## 2022-04-29 ENCOUNTER — HOSPITAL ENCOUNTER (INPATIENT)
Age: 37
LOS: 1 days | Discharge: HOME | DRG: 897 | End: 2022-05-01
Attending: EMERGENCY MEDICINE | Admitting: INTERNAL MEDICINE

## 2022-04-29 ENCOUNTER — APPOINTMENT (OUTPATIENT)
Dept: CT IMAGING | Age: 37
DRG: 897 | End: 2022-04-29

## 2022-04-29 DIAGNOSIS — E87.29 ALCOHOLIC KETOACIDOSIS: ICD-10-CM

## 2022-04-29 DIAGNOSIS — F10.930 ALCOHOL WITHDRAWAL SEIZURE WITHOUT COMPLICATION (HCC): Primary | ICD-10-CM

## 2022-04-29 DIAGNOSIS — R56.9 ALCOHOL WITHDRAWAL SEIZURE WITHOUT COMPLICATION (HCC): Primary | ICD-10-CM

## 2022-04-29 LAB
A/G RATIO: 1.2 (ref 1.1–2.2)
ALBUMIN SERPL-MCNC: 4.4 G/DL (ref 3.4–5)
ALP BLD-CCNC: 80 U/L (ref 40–129)
ALT SERPL-CCNC: 17 U/L (ref 10–40)
AMORPHOUS: ABNORMAL /HPF
AMPHETAMINE SCREEN, URINE: NORMAL
ANION GAP SERPL CALCULATED.3IONS-SCNC: 25 MMOL/L (ref 3–16)
APTT: 27.6 SEC (ref 26.2–38.6)
AST SERPL-CCNC: 56 U/L (ref 15–37)
BACTERIA: ABNORMAL /HPF
BARBITURATE SCREEN URINE: NORMAL
BASE EXCESS VENOUS: -13.7 MMOL/L (ref -3–3)
BASOPHILS ABSOLUTE: 0.1 K/UL (ref 0–0.2)
BASOPHILS RELATIVE PERCENT: 1.3 %
BENZODIAZEPINE SCREEN, URINE: NORMAL
BILIRUB SERPL-MCNC: 0.6 MG/DL (ref 0–1)
BILIRUBIN URINE: ABNORMAL
BLOOD, URINE: NEGATIVE
BUN BLDV-MCNC: 4 MG/DL (ref 7–20)
CALCIUM SERPL-MCNC: 10.2 MG/DL (ref 8.3–10.6)
CANNABINOID SCREEN URINE: NORMAL
CARBOXYHEMOGLOBIN: 2.1 % (ref 0–1.5)
CHLORIDE BLD-SCNC: 89 MMOL/L (ref 99–110)
CLARITY: CLEAR
CO2: 13 MMOL/L (ref 21–32)
COCAINE METABOLITE SCREEN URINE: NORMAL
COLOR: YELLOW
CREAT SERPL-MCNC: 0.8 MG/DL (ref 0.9–1.3)
EOSINOPHILS ABSOLUTE: 0 K/UL (ref 0–0.6)
EOSINOPHILS RELATIVE PERCENT: 0.8 %
EPITHELIAL CELLS, UA: ABNORMAL /HPF (ref 0–5)
ETHANOL: NORMAL MG/DL (ref 0–0.08)
GFR AFRICAN AMERICAN: >60
GFR NON-AFRICAN AMERICAN: >60
GLUCOSE BLD-MCNC: 156 MG/DL (ref 70–99)
GLUCOSE URINE: NEGATIVE MG/DL
HCO3 VENOUS: 12.2 MMOL/L (ref 23–29)
HCT VFR BLD CALC: 39.1 % (ref 40.5–52.5)
HEMOGLOBIN: 12.6 G/DL (ref 13.5–17.5)
INR BLD: 1.13 (ref 0.88–1.12)
KETONES, URINE: 15 MG/DL
LACTIC ACID: 10.1 MMOL/L (ref 0.4–2)
LEUKOCYTE ESTERASE, URINE: NEGATIVE
LYMPHOCYTES ABSOLUTE: 1.4 K/UL (ref 1–5.1)
LYMPHOCYTES RELATIVE PERCENT: 30.5 %
Lab: NORMAL
MAGNESIUM: 2 MG/DL (ref 1.8–2.4)
MCH RBC QN AUTO: 29.9 PG (ref 26–34)
MCHC RBC AUTO-ENTMCNC: 32.2 G/DL (ref 31–36)
MCV RBC AUTO: 93 FL (ref 80–100)
METHADONE SCREEN, URINE: NORMAL
METHEMOGLOBIN VENOUS: 0.6 %
MICROSCOPIC EXAMINATION: YES
MONOCYTES ABSOLUTE: 0.6 K/UL (ref 0–1.3)
MONOCYTES RELATIVE PERCENT: 13.2 %
MUCUS: ABNORMAL /LPF
NEUTROPHILS ABSOLUTE: 2.5 K/UL (ref 1.7–7.7)
NEUTROPHILS RELATIVE PERCENT: 54.2 %
NITRITE, URINE: NEGATIVE
O2 SAT, VEN: 87 %
O2 THERAPY: ABNORMAL
OPIATE SCREEN URINE: NORMAL
OXYCODONE URINE: NORMAL
PCO2, VEN: 29.1 MMHG (ref 40–50)
PDW BLD-RTO: 17.1 % (ref 12.4–15.4)
PH UA: 8
PH UA: 8 (ref 5–8)
PH VENOUS: 7.24 (ref 7.35–7.45)
PHENCYCLIDINE SCREEN URINE: NORMAL
PLATELET # BLD: 91 K/UL (ref 135–450)
PLATELET SLIDE REVIEW: ABNORMAL
PMV BLD AUTO: 7.7 FL (ref 5–10.5)
PO2, VEN: 61.4 MMHG (ref 25–40)
POTASSIUM SERPL-SCNC: 3.6 MMOL/L (ref 3.5–5.1)
PROPOXYPHENE SCREEN: NORMAL
PROTEIN UA: ABNORMAL MG/DL
PROTHROMBIN TIME: 12.8 SEC (ref 9.9–12.7)
RBC # BLD: 4.2 M/UL (ref 4.2–5.9)
RBC UA: ABNORMAL /HPF (ref 0–4)
SODIUM BLD-SCNC: 127 MMOL/L (ref 136–145)
SPECIFIC GRAVITY UA: 1.01 (ref 1–1.03)
TCO2 CALC VENOUS: 13 MMOL/L
TOTAL PROTEIN: 8.2 G/DL (ref 6.4–8.2)
URINE REFLEX TO CULTURE: ABNORMAL
URINE TYPE: ABNORMAL
UROBILINOGEN, URINE: 1 E.U./DL
WBC # BLD: 4.7 K/UL (ref 4–11)
WBC UA: ABNORMAL /HPF (ref 0–5)

## 2022-04-29 PROCEDURE — 83605 ASSAY OF LACTIC ACID: CPT

## 2022-04-29 PROCEDURE — 83735 ASSAY OF MAGNESIUM: CPT

## 2022-04-29 PROCEDURE — 82077 ASSAY SPEC XCP UR&BREATH IA: CPT

## 2022-04-29 PROCEDURE — 82803 BLOOD GASES ANY COMBINATION: CPT

## 2022-04-29 PROCEDURE — 6360000002 HC RX W HCPCS: Performed by: EMERGENCY MEDICINE

## 2022-04-29 PROCEDURE — 93005 ELECTROCARDIOGRAM TRACING: CPT | Performed by: EMERGENCY MEDICINE

## 2022-04-29 PROCEDURE — 2580000003 HC RX 258: Performed by: EMERGENCY MEDICINE

## 2022-04-29 PROCEDURE — 96374 THER/PROPH/DIAG INJ IV PUSH: CPT

## 2022-04-29 PROCEDURE — 80307 DRUG TEST PRSMV CHEM ANLYZR: CPT

## 2022-04-29 PROCEDURE — 81001 URINALYSIS AUTO W/SCOPE: CPT

## 2022-04-29 PROCEDURE — 85025 COMPLETE CBC W/AUTO DIFF WBC: CPT

## 2022-04-29 PROCEDURE — 70450 CT HEAD/BRAIN W/O DYE: CPT

## 2022-04-29 PROCEDURE — 96376 TX/PRO/DX INJ SAME DRUG ADON: CPT

## 2022-04-29 PROCEDURE — 99285 EMERGENCY DEPT VISIT HI MDM: CPT

## 2022-04-29 PROCEDURE — 85730 THROMBOPLASTIN TIME PARTIAL: CPT

## 2022-04-29 PROCEDURE — 85610 PROTHROMBIN TIME: CPT

## 2022-04-29 PROCEDURE — 80053 COMPREHEN METABOLIC PANEL: CPT

## 2022-04-29 RX ORDER — SODIUM CHLORIDE 9 MG/ML
1000 INJECTION, SOLUTION INTRAVENOUS CONTINUOUS
Status: DISCONTINUED | OUTPATIENT
Start: 2022-04-29 | End: 2022-05-01 | Stop reason: HOSPADM

## 2022-04-29 RX ORDER — 0.9 % SODIUM CHLORIDE 0.9 %
30 INTRAVENOUS SOLUTION INTRAVENOUS ONCE
Status: COMPLETED | OUTPATIENT
Start: 2022-04-29 | End: 2022-04-30

## 2022-04-29 RX ORDER — LORAZEPAM 2 MG/ML
2 INJECTION INTRAMUSCULAR ONCE
Status: COMPLETED | OUTPATIENT
Start: 2022-04-29 | End: 2022-04-29

## 2022-04-29 RX ORDER — LORAZEPAM 2 MG/ML
1 INJECTION INTRAMUSCULAR ONCE
Status: COMPLETED | OUTPATIENT
Start: 2022-04-29 | End: 2022-04-29

## 2022-04-29 RX ADMIN — LORAZEPAM 2 MG: 2 INJECTION INTRAMUSCULAR; INTRAVENOUS at 21:43

## 2022-04-29 RX ADMIN — LORAZEPAM 1 MG: 2 INJECTION INTRAMUSCULAR; INTRAVENOUS at 22:26

## 2022-04-29 RX ADMIN — SODIUM CHLORIDE 1905 ML: 9 INJECTION, SOLUTION INTRAVENOUS at 22:25

## 2022-04-29 RX ADMIN — SODIUM CHLORIDE 1000 ML: 9 INJECTION, SOLUTION INTRAVENOUS at 21:45

## 2022-04-29 ASSESSMENT — PAIN - FUNCTIONAL ASSESSMENT: PAIN_FUNCTIONAL_ASSESSMENT: NONE - DENIES PAIN

## 2022-04-30 PROBLEM — R56.9 ALCOHOL WITHDRAWAL SEIZURE WITH COMPLICATION (HCC): Status: ACTIVE | Noted: 2022-04-30

## 2022-04-30 PROBLEM — F10.939 ALCOHOL WITHDRAWAL SEIZURE WITH COMPLICATION (HCC): Status: ACTIVE | Noted: 2022-04-30

## 2022-04-30 LAB
EKG ATRIAL RATE: 125 BPM
EKG DIAGNOSIS: NORMAL
EKG P AXIS: 53 DEGREES
EKG P-R INTERVAL: 118 MS
EKG Q-T INTERVAL: 310 MS
EKG QRS DURATION: 80 MS
EKG QTC CALCULATION (BAZETT): 447 MS
EKG R AXIS: 44 DEGREES
EKG T AXIS: 11 DEGREES
EKG VENTRICULAR RATE: 125 BPM

## 2022-04-30 PROCEDURE — 93010 ELECTROCARDIOGRAM REPORT: CPT | Performed by: INTERNAL MEDICINE

## 2022-04-30 PROCEDURE — 2580000003 HC RX 258: Performed by: INTERNAL MEDICINE

## 2022-04-30 PROCEDURE — 6370000000 HC RX 637 (ALT 250 FOR IP): Performed by: INTERNAL MEDICINE

## 2022-04-30 PROCEDURE — 1200000000 HC SEMI PRIVATE

## 2022-04-30 RX ORDER — LORAZEPAM 1 MG/1
2 TABLET ORAL
Status: DISCONTINUED | OUTPATIENT
Start: 2022-04-30 | End: 2022-05-01 | Stop reason: HOSPADM

## 2022-04-30 RX ORDER — LORAZEPAM 2 MG/ML
3 INJECTION INTRAMUSCULAR
Status: DISCONTINUED | OUTPATIENT
Start: 2022-04-30 | End: 2022-05-01 | Stop reason: HOSPADM

## 2022-04-30 RX ORDER — ENOXAPARIN SODIUM 100 MG/ML
40 INJECTION SUBCUTANEOUS DAILY
Status: DISCONTINUED | OUTPATIENT
Start: 2022-04-30 | End: 2022-05-01 | Stop reason: HOSPADM

## 2022-04-30 RX ORDER — POTASSIUM CHLORIDE 7.45 MG/ML
10 INJECTION INTRAVENOUS PRN
Status: DISCONTINUED | OUTPATIENT
Start: 2022-04-30 | End: 2022-04-30

## 2022-04-30 RX ORDER — ACETAMINOPHEN 325 MG/1
650 TABLET ORAL EVERY 6 HOURS PRN
Status: DISCONTINUED | OUTPATIENT
Start: 2022-04-30 | End: 2022-05-01 | Stop reason: HOSPADM

## 2022-04-30 RX ORDER — LANOLIN ALCOHOL/MO/W.PET/CERES
100 CREAM (GRAM) TOPICAL DAILY
Status: DISCONTINUED | OUTPATIENT
Start: 2022-04-30 | End: 2022-05-01 | Stop reason: HOSPADM

## 2022-04-30 RX ORDER — LORAZEPAM 1 MG/1
3 TABLET ORAL
Status: DISCONTINUED | OUTPATIENT
Start: 2022-04-30 | End: 2022-05-01 | Stop reason: HOSPADM

## 2022-04-30 RX ORDER — FOLIC ACID 1 MG/1
1 TABLET ORAL DAILY
Status: DISCONTINUED | OUTPATIENT
Start: 2022-04-30 | End: 2022-05-01 | Stop reason: HOSPADM

## 2022-04-30 RX ORDER — PANTOPRAZOLE SODIUM 40 MG/1
40 TABLET, DELAYED RELEASE ORAL
Status: DISCONTINUED | OUTPATIENT
Start: 2022-04-30 | End: 2022-05-01 | Stop reason: HOSPADM

## 2022-04-30 RX ORDER — POTASSIUM CHLORIDE 20 MEQ/1
40 TABLET, EXTENDED RELEASE ORAL PRN
Status: DISCONTINUED | OUTPATIENT
Start: 2022-04-30 | End: 2022-04-30

## 2022-04-30 RX ORDER — ONDANSETRON 4 MG/1
4 TABLET, ORALLY DISINTEGRATING ORAL EVERY 8 HOURS PRN
Status: DISCONTINUED | OUTPATIENT
Start: 2022-04-30 | End: 2022-05-01 | Stop reason: HOSPADM

## 2022-04-30 RX ORDER — LORAZEPAM 2 MG/ML
1 INJECTION INTRAMUSCULAR
Status: DISCONTINUED | OUTPATIENT
Start: 2022-04-30 | End: 2022-05-01 | Stop reason: HOSPADM

## 2022-04-30 RX ORDER — LORAZEPAM 1 MG/1
4 TABLET ORAL
Status: DISCONTINUED | OUTPATIENT
Start: 2022-04-30 | End: 2022-05-01 | Stop reason: HOSPADM

## 2022-04-30 RX ORDER — DIAZEPAM 5 MG/1
10 TABLET ORAL ONCE
Status: COMPLETED | OUTPATIENT
Start: 2022-04-30 | End: 2022-04-30

## 2022-04-30 RX ORDER — DIAZEPAM 5 MG/1
5 TABLET ORAL 3 TIMES DAILY
Status: DISCONTINUED | OUTPATIENT
Start: 2022-04-30 | End: 2022-05-01 | Stop reason: HOSPADM

## 2022-04-30 RX ORDER — NICOTINE 21 MG/24HR
1 PATCH, TRANSDERMAL 24 HOURS TRANSDERMAL DAILY
Status: DISCONTINUED | OUTPATIENT
Start: 2022-04-30 | End: 2022-05-01 | Stop reason: HOSPADM

## 2022-04-30 RX ORDER — ACETAMINOPHEN 650 MG/1
650 SUPPOSITORY RECTAL EVERY 6 HOURS PRN
Status: DISCONTINUED | OUTPATIENT
Start: 2022-04-30 | End: 2022-05-01 | Stop reason: HOSPADM

## 2022-04-30 RX ORDER — ONDANSETRON 2 MG/ML
4 INJECTION INTRAMUSCULAR; INTRAVENOUS EVERY 6 HOURS PRN
Status: DISCONTINUED | OUTPATIENT
Start: 2022-04-30 | End: 2022-05-01 | Stop reason: HOSPADM

## 2022-04-30 RX ORDER — LORAZEPAM 2 MG/ML
2 INJECTION INTRAMUSCULAR
Status: DISCONTINUED | OUTPATIENT
Start: 2022-04-30 | End: 2022-05-01 | Stop reason: HOSPADM

## 2022-04-30 RX ORDER — MAGNESIUM SULFATE 1 G/100ML
1000 INJECTION INTRAVENOUS PRN
Status: DISCONTINUED | OUTPATIENT
Start: 2022-04-30 | End: 2022-04-30

## 2022-04-30 RX ORDER — M-VIT,TX,IRON,MINS/CALC/FOLIC 27MG-0.4MG
1 TABLET ORAL DAILY
Status: DISCONTINUED | OUTPATIENT
Start: 2022-04-30 | End: 2022-05-01 | Stop reason: HOSPADM

## 2022-04-30 RX ORDER — LORAZEPAM 1 MG/1
1 TABLET ORAL
Status: DISCONTINUED | OUTPATIENT
Start: 2022-04-30 | End: 2022-05-01 | Stop reason: HOSPADM

## 2022-04-30 RX ORDER — LORAZEPAM 2 MG/ML
4 INJECTION INTRAMUSCULAR
Status: DISCONTINUED | OUTPATIENT
Start: 2022-04-30 | End: 2022-05-01 | Stop reason: HOSPADM

## 2022-04-30 RX ADMIN — Medication 100 MG: at 10:46

## 2022-04-30 RX ADMIN — SODIUM CHLORIDE 1000 ML: 9 INJECTION, SOLUTION INTRAVENOUS at 12:54

## 2022-04-30 RX ADMIN — DIAZEPAM 5 MG: 5 TABLET ORAL at 15:09

## 2022-04-30 RX ADMIN — FOLIC ACID 1 MG: 1 TABLET ORAL at 10:46

## 2022-04-30 RX ADMIN — PANTOPRAZOLE SODIUM 40 MG: 40 TABLET, DELAYED RELEASE ORAL at 10:46

## 2022-04-30 RX ADMIN — DIAZEPAM 5 MG: 5 TABLET ORAL at 21:42

## 2022-04-30 RX ADMIN — DIAZEPAM 10 MG: 5 TABLET ORAL at 10:46

## 2022-04-30 RX ADMIN — Medication 1 TABLET: at 10:46

## 2022-04-30 NOTE — H&P
Hospital Medicine History & Physical      PCP: No primary care provider on file. Date of Admission: 4/29/2022    Date of Service: Pt seen/examined on 30 April and Admitted to Inpatient with expected LOS greater than two midnights due to medical therapy. Chief Complaint:  Seizure      History Of Present Illness:      43 y.o. male w/ hx chronic Etoh abuse but no known seizure hx on no baseline epileptic tx who presented to [facility] s/p seizure event w/ subsequent mild head trauma. He is currently w/out focal neuro c/o. The patient denies any fever/chills or other signs/sxs of systemic illness or identifiable aggravating/alleviating factors. Past Medical History:      History reviewed. No pertinent past medical history. Past Surgical History:          Procedure Laterality Date    FRACTURE SURGERY  ankle       Medications Prior to Admission:      Prior to Admission medications    Medication Sig Start Date End Date Taking? Authorizing Provider   benzonatate (TESSALON PERLES) 100 MG capsule Take 1 capsule by mouth 3 times daily as needed for Cough 11/21/21   Sulma Rodrigues PA-C   ondansetron (ZOFRAN ODT) 4 MG disintegrating tablet Take 1 tablet by mouth every 8 hours as needed for Nausea or Vomiting 11/21/21   Sulma Rodrigues PA-C   folic acid (FOLVITE) 1 MG tablet Take 1 tablet by mouth daily 2/24/21   Yue Lewis MD   Multiple Vitamin (MULTIVITAMIN) TABS tablet Take 1 tablet by mouth daily 2/24/21   Yue Lewis MD   thiamine mononitrate 100 MG tablet Take 1 tablet by mouth daily for 15 days 2/24/21 3/11/21  Yue Lewis MD   pantoprazole (PROTONIX) 40 MG tablet Take 1 tablet by mouth every morning (before breakfast) 2/23/21 3/25/21  Yue Lewis MD       Allergies:  Patient has no known allergies. Social History:      The patient currently lives independently    TOBACCO:   reports that he has never smoked.  He has never used smokeless tobacco.  ETOH:   reports current alcohol use of about 6.0 standard drinks of alcohol per week. Family History:      Reviewed in detail and negative for DM, CAD, Cancer, CVA. Positive as follows:    History reviewed. No pertinent family history. REVIEW OF SYSTEMS:   Pertinent positives as noted in the HPI. All other systems reviewed and negative. PHYSICAL EXAM:    /86   Pulse 80   Temp 97.6 °F (36.4 °C) (Oral)   Resp 16   Ht 5' 7\" (1.702 m)   Wt 140 lb (63.5 kg)   SpO2 99%   BMI 21.93 kg/m²     General appearance:  No apparent distress, appears stated age and cooperative. HEENT:  Normal cephalic, atraumatic without obvious deformity. Pupils equal, round, and reactive to light. Extra ocular muscles intact. Conjunctivae/corneas clear. Neck: Supple, with full range of motion. No jugular venous distention. Trachea midline. Respiratory:  Normal respiratory effort. Clear to auscultation, bilaterally without Rales/Wheezes/Rhonchi. Cardiovascular:  Regular rate and rhythm with normal S1/S2 without murmurs, rubs or gallops. Abdomen: Soft, non-tender, non-distended with normal bowel sounds. Musculoskeletal:  No clubbing, cyanosis or edema bilaterally. Full range of motion without deformity. Skin: Skin color, texture, turgor normal.  No rashes or lesions. Neurologic:  Neurovascularly intact without any focal sensory/motor deficits. Cranial nerves: II-XII intact, grossly non-focal.  Psychiatric:  Alert and oriented, thought content appropriate, normal insight  Capillary Refill: Brisk,< 3 seconds   Peripheral Pulses: +2 palpable, equal bilaterally       CXR:  I have reviewed the CXR with the following interpretation: N/A  EKG:  I have reviewed the EKG with the following interpretation: Tachy w/out acute ischemic changes.      Labs:     Recent Labs     04/29/22 2129   WBC 4.7   HGB 12.6*   HCT 39.1*   PLT 91*     Recent Labs     04/29/22 2129   *   K 3.6   CL 89*   CO2 13*   BUN 4*   CREATININE 0.8*   CALCIUM 10.2 Recent Labs     04/29/22 2129   AST 56*   ALT 17   BILITOT 0.6   ALKPHOS 80     Recent Labs     04/29/22 2143   INR 1.13*     No results for input(s): Mathew Oiler in the last 72 hours. Urinalysis:      Lab Results   Component Value Date    NITRU Negative 04/29/2022    WBCUA 0-2 04/29/2022    BACTERIA Rare 04/29/2022    RBCUA None seen 04/29/2022    BLOODU Negative 04/29/2022    SPECGRAV 1.015 04/29/2022    GLUCOSEU Negative 04/29/2022         Consults:    IP CONSULT TO HOSPITALIST  IP CONSULT TO SOCIAL WORK      ASSESSMENT:    Active Hospital Problems    Diagnosis Date Noted    Alcohol withdrawal seizure with complication ([facility] [address] [A12.899, R56.9] 04/30/2022     Priority: Medium       PLAN:      Seizure - likely 2nd to Etoh w/drawal.  Elevated Lactate w/out evidence of SIRS/Sepsis. CT head unremarkable    Alcohol Abuse - active and ongoing w/ dependence and w/drawal.  Cessation counseled. Placed on scheduled Valium, high dose. Started on CIWA w/ PRN Ativan. HypoNatremia - etiology clinically unable to determine but likely hypovolemic 2nd to above. Follow serial labs on IVF. Reviewed and documented as above. Tobacco Abuse - active and ongoing. Cessation counseled. Nicotine replacement PRN. DVT Prophylaxis: LMWH  Diet: ADULT DIET; Regular  Code Status: Full Code      PT/OT Eval Status: Not yet ordered. Dispo - Patient is likely to remain in-house at least until Sunday/Monday 1/2 May pending clinical course.         Won Plasencia MD

## 2022-04-30 NOTE — ED PROVIDER NOTES
CHIEF COMPLAINT  Seizures (1 seizure at work per ems, bump on back of pt's head, bit his tongue, no hx of seizures)      HISTORY OF PRESENT ILLNESS  Rey Nassar is a 43 y.o. male with a history of alcohol abuse who presents to the ED complaining of head trauma following probable seizure. Reports prior history of alcohol withdrawal.  Last drink was yesterday evening. Was at work today and apparently suffered a seizure causing him to fall to the ground and strike his head. He denies fever, chills, chest pain, dyspnea, nausea, vomiting, diarrhea, dysuria. No report of recent illness. No other complaints, modifying factors or associated symptoms. I have reviewed the following from the nursing documentation. No past medical history on file. Past Surgical History:   Procedure Laterality Date    FRACTURE SURGERY  ankle     No family history on file. Social History     Socioeconomic History    Marital status: Single     Spouse name: Not on file    Number of children: Not on file    Years of education: Not on file    Highest education level: Not on file   Occupational History    Not on file   Tobacco Use    Smoking status: Never Smoker    Smokeless tobacco: Never Used   Substance and Sexual Activity    Alcohol use: Yes     Alcohol/week: 6.0 standard drinks     Types: 6 Cans of beer per week     Comment: daily     Drug use: No    Sexual activity: Not on file   Other Topics Concern    Not on file   Social History Narrative    Not on file     Social Determinants of Health     Financial Resource Strain:     Difficulty of Paying Living Expenses: Not on file   Food Insecurity:     Worried About Running Out of Food in the Last Year: Not on file    Stanley of Food in the Last Year: Not on file   Transportation Needs:     Lack of Transportation (Medical): Not on file    Lack of Transportation (Non-Medical):  Not on file   Physical Activity:     Days of Exercise per Week: Not on file    Minutes of Exercise per Session: Not on file   Stress:     Feeling of Stress : Not on file   Social Connections:     Frequency of Communication with Friends and Family: Not on file    Frequency of Social Gatherings with Friends and Family: Not on file    Attends [employer] Services: Not on file    Active Member of [address] [place] [address] or Organizations: Not on file    Attends Club or Organization Meetings: Not on file    Marital Status: Not on file   Intimate Partner Violence:     Fear of Current or Ex-Partner: Not on file    Emotionally Abused: Not on file    Physically Abused: Not on file    Sexually Abused: Not on file   Housing Stability:     Unable to Pay for Housing in the Last Year: Not on file    Number of [place] in the Last Year: Not on file    Unstable Housing in the Last Year: Not on file     Current Facility-Administered Medications   Medication Dose Route Frequency Provider Last Rate Last Admin    0.9 % sodium chloride infusion  1,000 mL IntraVENous Continuous Domitila Velez MD   Stopped at 04/29/22 8966     Current Outpatient Medications   Medication Sig Dispense Refill    benzonatate (TESSALON PERLES) 100 MG capsule Take 1 capsule by mouth 3 times daily as needed for Cough 30 capsule 0    ondansetron (ZOFRAN ODT) 4 MG disintegrating tablet Take 1 tablet by mouth every 8 hours as needed for Nausea or Vomiting 21 tablet 0    folic acid (FOLVITE) 1 MG tablet Take 1 tablet by mouth daily 30 tablet 0    Multiple Vitamin (MULTIVITAMIN) TABS tablet Take 1 tablet by mouth daily 30 tablet 0    thiamine mononitrate 100 MG tablet Take 1 tablet by mouth daily for 15 days 15 tablet 0    pantoprazole (PROTONIX) 40 MG tablet Take 1 tablet by mouth every morning (before breakfast) 30 tablet 0     No Known Allergies    REVIEW OF SYSTEMS  10 systems reviewed, pertinent positives per HPI otherwise noted to be negative.     PHYSICAL EXAM  /63   Pulse 112   Resp 16   Ht 5' 7\" (1.702 m)   Wt 140 lb (63.5 kg)   SpO2 97%   BMI 21.93 kg/m²   GENERAL APPEARANCE: Awake and alert. Cooperative. Appears slightly anxious and unkempt. HEAD: Normocephalic. Right parietal scalp contusion with swelling. Skin intact. EYES: PERRL. EOM's grossly intact. ENT: Mucous membranes are moist.   NECK: Supple, trachea midline. HEART: RR rate 116. Normal S1, S2. No murmurs, rubs or gallops. LUNGS: Respirations unlabored. CTAB. Good air exchange. No wheezes, rales, or rhonchi. Speaking comfortably in full sentences. ABDOMEN: Soft. Non-distended. Non-tender. No guarding or rebound. Normal Bowel sounds. EXTREMITIES: No peripheral edema. MAEE. No acute deformities. SKIN: Warm and dry. No acute rashes. NEUROLOGICAL: Alert and oriented X 3. CN II-XII intact. No gross facial drooping. Strength 5/5, sensation intact. No pronator drift. Normal coordination. PSYCHIATRIC: Normal mood and affect. LABS  I have reviewed all labs for this visit. Results for orders placed or performed during the hospital encounter of 04/29/22   CBC with Auto Differential   Result Value Ref Range    WBC 4.7 4.0 - 11.0 K/uL    RBC 4.20 4. 20 - 5.90 M/uL    Hemoglobin 12.6 (L) 13.5 - 17.5 g/dL    Hematocrit 39.1 (L) 40.5 - 52.5 %    MCV 93.0 80.0 - 100.0 fL    MCH 29.9 26.0 - 34.0 pg    MCHC 32.2 31.0 - 36.0 g/dL    RDW 17.1 (H) 12.4 - 15.4 %    Platelets 91 (L) 724 - 450 K/uL    MPV 7.7 5.0 - 10.5 fL    PLATELET SLIDE REVIEW Decreased     Neutrophils % 54.2 %    Lymphocytes % 30.5 %    Monocytes % 13.2 %    Eosinophils % 0.8 %    Basophils % 1.3 %    Neutrophils Absolute 2.5 1.7 - 7.7 K/uL    Lymphocytes Absolute 1.4 1.0 - 5.1 K/uL    Monocytes Absolute 0.6 0.0 - 1.3 K/uL    Eosinophils Absolute 0.0 0.0 - 0.6 K/uL    Basophils Absolute 0.1 0.0 - 0.2 K/uL   Comprehensive Metabolic Panel   Result Value Ref Range    Sodium 127 (L) 136 - 145 mmol/L    Potassium 3.6 3.5 - 5.1 mmol/L    Chloride 89 (L) 99 - 110 mmol/L    CO2 13 (LL) 21 - 32 mmol/L Anion Gap 25 (H) 3 - 16    Glucose 156 (H) 70 - 99 mg/dL    BUN 4 (L) 7 - 20 mg/dL    CREATININE 0.8 (L) 0.9 - 1.3 mg/dL    GFR Non-African American >60 >60    GFR African American >60 >60    Calcium 10.2 8.3 - 10.6 mg/dL    Total Protein 8.2 6.4 - 8.2 g/dL    Albumin 4.4 3.4 - 5.0 g/dL    Albumin/Globulin Ratio 1.2 1.1 - 2.2    Total Bilirubin 0.6 0.0 - 1.0 mg/dL    Alkaline Phosphatase 80 40 - 129 U/L    ALT 17 10 - 40 U/L    AST 56 (H) 15 - 37 U/L   Magnesium   Result Value Ref Range    Magnesium 2.00 1.80 - 2.40 mg/dL   Ethanol   Result Value Ref Range    Ethanol Lvl None Detected mg/dL   Protime-INR   Result Value Ref Range    Protime 12.8 (H) 9.9 - 12.7 sec    INR 1.13 (H) 0.88 - 1.12   APTT   Result Value Ref Range    aPTT 27.6 26.2 - 38.6 sec   Lactic Acid   Result Value Ref Range    Lactic Acid 10.1 (HH) 0.4 - 2.0 mmol/L   Blood Gas, Venous   Result Value Ref Range    pH, Beto 7.241 (L) 7.350 - 7.450    pCO2, Beto 29.1 (L) 40.0 - 50.0 mmHg    pO2, Beto 61.4 (H) 25.0 - 40.0 mmHg    HCO3, Venous 12.2 (L) 23.0 - 29.0 mmol/L    Base Excess, Beto -13.7 (L) -3.0 - 3.0 mmol/L    O2 Sat, Beto 87 Not Established %    Carboxyhemoglobin 2.1 (H) 0.0 - 1.5 %    MetHgb, Beto 0.6 <1.5 %    TC02 (Calc), Beto 13 Not Established mmol/L    O2 Therapy Unknown        EKG  Sinus tachycardia, rate 125, normal axis, QTC within normal limits, no acute ST abnormalities. Nonspecific inferior T wave inversion appears unchanged from prior on 2/21/2021      RADIOLOGY  X-RAYS:  I have reviewed radiologic plain film image(s). ALL OTHER NON-PLAIN FILM IMAGES SUCH AS CT, ULTRASOUND AND MRI HAVE BEEN READ BY THE RADIOLOGIST. CT Head WO Contrast   Final Result   No acute intracranial abnormality. Rechecks: Physical assessment performed. Resting, appears calm/comfortable. Critical Care: 35min. Management of alcohol withdrawal with seizure as well as alcoholic ketoacidosis. Extensive labs and imaging.   Frequent reevaluation. ED COURSE/MDM  Patient seen and evaluated. Old records reviewed. Labs and imaging reviewed and results discussed with patient. Patient presents with closed head injury related to alcohol withdrawal seizure. He has no evidence of intracranial hemorrhage on CT. Sinus tachycardia which is slowly improving with Ativan and fluids. Patient also found to be an alcoholic ketoacidosis. Admitted to the hospitalist service. New Prescriptions    No medications on file       CLINICAL IMPRESSION  1. Alcohol withdrawal seizure without complication ([address]    2. Alcoholic ketoacidosis        Blood pressure 113/63, pulse 112, resp. rate 16, height 5' 7\" (1.702 m), weight 140 lb (63.5 kg), SpO2 97 %. DISPOSITION  Shamir Diana was admitted in stable condition.       Jacque Machuca MD  04/29/22 3361

## 2022-05-01 VITALS
SYSTOLIC BLOOD PRESSURE: 124 MMHG | RESPIRATION RATE: 16 BRPM | OXYGEN SATURATION: 98 % | HEIGHT: 67 IN | BODY MASS INDEX: 24.33 KG/M2 | WEIGHT: 155 LBS | DIASTOLIC BLOOD PRESSURE: 77 MMHG | TEMPERATURE: 97.8 F | HEART RATE: 65 BPM

## 2022-05-01 LAB
A/G RATIO: 1.1 (ref 1.1–2.2)
ALBUMIN SERPL-MCNC: 3.3 G/DL (ref 3.4–5)
ALP BLD-CCNC: 60 U/L (ref 40–129)
ALT SERPL-CCNC: 13 U/L (ref 10–40)
ANION GAP SERPL CALCULATED.3IONS-SCNC: 12 MMOL/L (ref 3–16)
AST SERPL-CCNC: 39 U/L (ref 15–37)
BASOPHILS ABSOLUTE: 0 K/UL (ref 0–0.2)
BASOPHILS RELATIVE PERCENT: 0.7 %
BILIRUB SERPL-MCNC: 0.5 MG/DL (ref 0–1)
BUN BLDV-MCNC: 5 MG/DL (ref 7–20)
CALCIUM SERPL-MCNC: 9.1 MG/DL (ref 8.3–10.6)
CHLORIDE BLD-SCNC: 108 MMOL/L (ref 99–110)
CO2: 18 MMOL/L (ref 21–32)
CREAT SERPL-MCNC: 0.7 MG/DL (ref 0.9–1.3)
EOSINOPHILS ABSOLUTE: 0.1 K/UL (ref 0–0.6)
EOSINOPHILS RELATIVE PERCENT: 1.9 %
GFR AFRICAN AMERICAN: >60
GFR NON-AFRICAN AMERICAN: >60
GLUCOSE BLD-MCNC: 109 MG/DL (ref 70–99)
HCT VFR BLD CALC: 33.7 % (ref 40.5–52.5)
HEMOGLOBIN: 11.1 G/DL (ref 13.5–17.5)
INR BLD: 1.13 (ref 0.88–1.12)
LYMPHOCYTES ABSOLUTE: 1.1 K/UL (ref 1–5.1)
LYMPHOCYTES RELATIVE PERCENT: 25 %
MAGNESIUM: 1.5 MG/DL (ref 1.8–2.4)
MCH RBC QN AUTO: 30.4 PG (ref 26–34)
MCHC RBC AUTO-ENTMCNC: 33.1 G/DL (ref 31–36)
MCV RBC AUTO: 91.9 FL (ref 80–100)
MONOCYTES ABSOLUTE: 0.4 K/UL (ref 0–1.3)
MONOCYTES RELATIVE PERCENT: 8.5 %
NEUTROPHILS ABSOLUTE: 2.7 K/UL (ref 1.7–7.7)
NEUTROPHILS RELATIVE PERCENT: 63.9 %
PDW BLD-RTO: 17.2 % (ref 12.4–15.4)
PHOSPHORUS: 3.6 MG/DL (ref 2.5–4.9)
PLATELET # BLD: 75 K/UL (ref 135–450)
PMV BLD AUTO: 8.1 FL (ref 5–10.5)
POTASSIUM SERPL-SCNC: 3.4 MMOL/L (ref 3.5–5.1)
PROTHROMBIN TIME: 12.8 SEC (ref 9.9–12.7)
RBC # BLD: 3.67 M/UL (ref 4.2–5.9)
SODIUM BLD-SCNC: 138 MMOL/L (ref 136–145)
TOTAL PROTEIN: 6.2 G/DL (ref 6.4–8.2)
WBC # BLD: 4.2 K/UL (ref 4–11)

## 2022-05-01 PROCEDURE — 85610 PROTHROMBIN TIME: CPT

## 2022-05-01 PROCEDURE — 80053 COMPREHEN METABOLIC PANEL: CPT

## 2022-05-01 PROCEDURE — 83735 ASSAY OF MAGNESIUM: CPT

## 2022-05-01 PROCEDURE — 36415 COLL VENOUS BLD VENIPUNCTURE: CPT

## 2022-05-01 PROCEDURE — 6370000000 HC RX 637 (ALT 250 FOR IP): Performed by: INTERNAL MEDICINE

## 2022-05-01 PROCEDURE — 84100 ASSAY OF PHOSPHORUS: CPT

## 2022-05-01 PROCEDURE — 85025 COMPLETE CBC W/AUTO DIFF WBC: CPT

## 2022-05-01 PROCEDURE — 6360000002 HC RX W HCPCS: Performed by: INTERNAL MEDICINE

## 2022-05-01 RX ADMIN — Medication 100 MG: at 09:44

## 2022-05-01 RX ADMIN — PANTOPRAZOLE SODIUM 40 MG: 40 TABLET, DELAYED RELEASE ORAL at 05:32

## 2022-05-01 RX ADMIN — FOLIC ACID 1 MG: 1 TABLET ORAL at 09:43

## 2022-05-01 RX ADMIN — Medication 1 TABLET: at 09:44

## 2022-05-01 RX ADMIN — DIAZEPAM 5 MG: 5 TABLET ORAL at 09:43

## 2022-05-01 RX ADMIN — ACETAMINOPHEN 650 MG: 325 TABLET ORAL at 09:43

## 2022-05-01 RX ADMIN — DIAZEPAM 5 MG: 5 TABLET ORAL at 15:07

## 2022-05-01 ASSESSMENT — PAIN SCALES - GENERAL: PAINLEVEL_OUTOF10: 4

## 2022-05-01 NOTE — PROGRESS NOTES
Hospitalist Progress Note      PCP: No primary care provider on file. Date of Admission: 4/29/2022    Chief Complaint: Seizure    Subjective: no new c/o. Medications:  Reviewed    Infusion Medications    sodium chloride 125 mL/hr at 04/30/22 2142     Scheduled Medications    folic acid  1 mg Oral Daily    pantoprazole  40 mg Oral QAM AC    enoxaparin  40 mg SubCUTAneous Daily    multivitamin  1 tablet Oral Daily    thiamine  100 mg Oral Daily    nicotine  1 patch TransDERmal Daily    diazePAM  5 mg Oral TID     PRN Meds: ondansetron **OR** ondansetron, acetaminophen **OR** acetaminophen, LORazepam **OR** LORazepam **OR** LORazepam **OR** LORazepam **OR** LORazepam **OR** LORazepam **OR** LORazepam **OR** LORazepam      Intake/Output Summary (Last 24 hours) at 5/1/2022 1009  Last data filed at 4/30/2022 1510  Gross per 24 hour   Intake 240 ml   Output --   Net 240 ml       Physical Exam Performed:    /69   Pulse 68   Temp 98.2 °F (36.8 °C) (Oral)   Resp 15   Ht 5' 7\" (1.702 m)   Wt 155 lb (70.3 kg)   SpO2 97%   BMI 24.28 kg/m²     General appearance: No apparent distress, appears stated age and cooperative. HEENT: Pupils equal, round, and reactive to light. Conjunctivae/corneas clear. Neck: Supple, with full range of motion. No jugular venous distention. Trachea midline. Respiratory:  Normal respiratory effort. Clear to auscultation, bilaterally without Rales/Wheezes/Rhonchi. Cardiovascular: Regular rate and rhythm with normal S1/S2 without murmurs, rubs or gallops. Abdomen: Soft, non-tender, non-distended with normal bowel sounds. Musculoskeletal: No clubbing, cyanosis or edema bilaterally. Full range of motion without deformity. Skin: Skin color, texture, turgor normal.  No rashes or lesions. Neurologic:  Neurovascularly intact without any focal sensory/motor deficits.  Cranial nerves: II-XII intact, grossly non-focal.  Psychiatric: Alert and oriented, thought content appropriate, normal insight  Capillary Refill: Brisk,< 3 seconds   Peripheral Pulses: +2 palpable, equal bilaterally       Labs:   Recent Labs     04/29/22 2129 05/01/22  0548   WBC 4.7 4.2   HGB 12.6* 11.1*   HCT 39.1* 33.7*   PLT 91* 75*     Recent Labs     04/29/22 2129 05/01/22  0548   * 138   K 3.6 3.4*   CL 89* 108   CO2 13* 18*   BUN 4* 5*   CREATININE 0.8* 0.7*   CALCIUM 10.2 9.1   PHOS  --  3.6     Recent Labs     04/29/22 2129 05/01/22  0548   AST 56* 39*   ALT 17 13   BILITOT 0.6 0.5   ALKPHOS 80 60     Recent Labs     04/29/22 2143 05/01/22  0548   INR 1.13* 1.13*     No results for input(s): CKTOTAL, TROPONINI in the last 72 hours. Urinalysis:      Lab Results   Component Value Date    NITRU Negative 04/29/2022    WBCUA 0-2 04/29/2022    BACTERIA Rare 04/29/2022    RBCUA None seen 04/29/2022    BLOODU Negative 04/29/2022    SPECGRAV 1.015 04/29/2022    GLUCOSEU Negative 04/29/2022       Consults:    IP CONSULT TO HOSPITALIST  IP CONSULT TO SOCIAL WORK      Assessment/Plan:    Active Hospital Problems    Diagnosis     Alcohol withdrawal seizure with complication ([facility] [address] [M41.236, R56.9]      Priority: Medium         Seizure - likely 2nd to Etoh w/drawal.  Elevated Lactate w/out evidence of SIRS/Sepsis. CT head unremarkable     Alcohol Abuse - active and ongoing w/ dependence and w/drawal.  Cessation counseled. Placed on scheduled Valium, high dose. Started on CIWA w/ PRN Ativan.      HypoNatremia - etiology clinically unable to determine but likely hypovolemic 2nd to above. Follow serial labs on IVF. Reviewed and documented as above.     Tobacco Abuse - active and ongoing. Cessation counseled. Nicotine replacement PRN.       DVT Prophylaxis: LMWH - OK to continue w/ platelets as follows. Recent Labs     04/29/22 2129 05/01/22  0548   PLT 91* 75*     Diet: ADULT DIET;  Regular  Code Status: Full Code         PT/OT Eval Status: Not yet ordered.      Dispo - Patient is likely to remain in-house at least until Sunday/Monday 1/2 May pending clinical course.   Pablito Alvarez MD

## 2022-05-01 NOTE — PROGRESS NOTES
No sign of seizure activity today. No sign of ETOH withdrawal except headache which was resolved with  Dose of tylenol as ordered and requested.

## 2022-05-01 NOTE — CARE COORDINATION
CASE MANAGEMENT INITIAL ASSESSMENT      Reviewed chart and completed assessment with patient: bedside   Family present: none  Explained Case Management role/services. Primary contact information:[facility] Decision Maker :   Primary Decision Maker: Marcelo Westbrook - Brother/Sister - [phone]          Can this person be reached and be able to respond quickly, such as within a few minutes or hours? Yes    Admit date/status:4/30/22  Diagnosis:alcololic ketoacidosis   Is this a Readmission?:  No      Insurance:none will contact finance to see   Precert required for SNF: No       3 night stay required: No    Living arrangements, Adls, care needs, prior to admission:lives in 2 story home with roommate 1st floor master    Durable Medical Equipment at home:  Walker__Cane__RTS__ BSC__Shower Chair__  02__ HHN__ CPAP__  BiPap__  Hospital Bed__ W/C___ Other_____    Services in the home and/or outpatient, prior to admission:none    Current PCP:none given care clinic information                                Medications Prescription coverage?none   Will pt require financial assistance with medications possible     Transportation needs: tbd     PT/OT recs:none    Hospital Exemption Notification (HEN):needed for SNf    Barriers to discharge:no payer, no PCP    Plan/comments:spoke with patient. Plans on returning home at discharge. Reported lives in home with room mate. [employer] uses no DME. Transportation provided by bus. Discussed SA resources, declined. Will follow clinical progress for needs.  Ekaterina Coello RN       ECOC on chart for MD signature

## 2022-05-01 NOTE — PROGRESS NOTES
Discharged to home. Verbal and written discharge instructions given Encouraged to follow at [facility]. Written info given per discharge planner. Accompanied to car via wheelchair. Home in [place] that he called for himself.

## 2022-05-01 NOTE — PLAN OF CARE
Problem: Safety - Adult  Goal: Free from fall injury  4/30/2022 2145 by Anahi Perkins RN  Outcome: Progressing  4/30/2022 1836 by Missy Melara RN  Outcome: Progressing

## 2022-05-02 NOTE — DISCHARGE SUMMARY
Hospital Medicine Discharge Summary    Patient ID: Aneta Rubinstein      Patient's PCP: No primary care provider on file. Admit Date: 4/29/2022     Discharge Date: 5/1/2022      Admitting Physician: Herbie Bardales MD     Discharge Physician: Willie Chopra MD     Discharge Diagnoses: Active Hospital Problems    Diagnosis     Alcohol withdrawal seizure with complication ([facility] [address] [D79.153, R56.9]      Priority: Medium       The patient was seen and examined on day of discharge and this discharge summary is in conjunction with any daily progress note from day of discharge. Hospital Course:        Seizure - likely 2nd to Etoh w/drawal.  Elevated Lactate w/out evidence of SIRS/Sepsis.  CT head unremarkable     Alcohol Abuse - active and ongoing w/ dependence and w/drawal.  Cessation counseled.  Placed on scheduled Valium, high dose.  Started on CIWA w/ PRN Ativan.      HypoNatremia - etiology clinically unable to determine but likely hypovolemic 2nd to above.  Followed serial labs on IVF.      Tobacco Abuse - active and ongoing.  Cessation counseled.  Nicotine replacement PRN.       Labs: For convenience and continuity at follow-up the following most recent labs are provided:      CBC:    Lab Results   Component Value Date    WBC 4.2 05/01/2022    HGB 11.1 05/01/2022    HCT 33.7 05/01/2022    PLT 75 05/01/2022       Renal:    Lab Results   Component Value Date     05/01/2022    K 3.4 05/01/2022    K 3.2 11/21/2021     05/01/2022    CO2 18 05/01/2022    BUN 5 05/01/2022    CREATININE 0.7 05/01/2022    CALCIUM 9.1 05/01/2022    PHOS 3.6 05/01/2022         Significant Diagnostic Studies    Radiology:   CT Head WO Contrast   Final Result   No acute intracranial abnormality.                 Consults:     IP CONSULT TO HOSPITALIST  IP CONSULT TO SOCIAL WORK    Disposition: Home     Condition at Discharge: Stable    Discharge Instructions/Follow-up:  w/ PCP 1-2 weeks and subspecialists as arranged. Code Status:  Full Code    Activity: activity as tolerated    Diet: regular diet      Discharge Medications:     Discharge Medication List as of 5/1/2022  3:45 PM          Time Spent on discharge is more than 30 minutes in the examination, evaluation, counseling and review of medications and discharge plan.       Signed:    Julio Carrera MD   5/2/2022

## 2023-01-15 ENCOUNTER — HOSPITAL ENCOUNTER (EMERGENCY)
Age: 38
Discharge: HOME | End: 2023-01-15

## 2023-01-15 VITALS
WEIGHT: 155 LBS | SYSTOLIC BLOOD PRESSURE: 123 MMHG | RESPIRATION RATE: 18 BRPM | BODY MASS INDEX: 24.33 KG/M2 | HEART RATE: 94 BPM | HEIGHT: 67 IN | DIASTOLIC BLOOD PRESSURE: 99 MMHG | OXYGEN SATURATION: 98 % | TEMPERATURE: 97.9 F

## 2023-01-15 DIAGNOSIS — F10.920 ALCOHOLIC INTOXICATION WITHOUT COMPLICATION (HCC): Primary | ICD-10-CM

## 2023-01-15 LAB
A/G RATIO: 1.6 (ref 1.1–2.2)
ALBUMIN SERPL-MCNC: 4.7 G/DL (ref 3.4–5)
ALP BLD-CCNC: 53 U/L (ref 40–129)
ALT SERPL-CCNC: 12 U/L (ref 10–40)
ANION GAP SERPL CALCULATED.3IONS-SCNC: 18 MMOL/L (ref 3–16)
AST SERPL-CCNC: 32 U/L (ref 15–37)
BASOPHILS ABSOLUTE: 0.1 K/UL (ref 0–0.2)
BASOPHILS RELATIVE PERCENT: 3 %
BILIRUB SERPL-MCNC: <0.2 MG/DL (ref 0–1)
BUN BLDV-MCNC: 4 MG/DL (ref 7–20)
CALCIUM SERPL-MCNC: 8.7 MG/DL (ref 8.3–10.6)
CHLORIDE BLD-SCNC: 96 MMOL/L (ref 99–110)
CO2: 21 MMOL/L (ref 21–32)
CREAT SERPL-MCNC: 0.7 MG/DL (ref 0.9–1.3)
EOSINOPHILS ABSOLUTE: 0.1 K/UL (ref 0–0.6)
EOSINOPHILS RELATIVE PERCENT: 1.9 %
GFR SERPL CREATININE-BSD FRML MDRD: >60 ML/MIN/{1.73_M2}
GLUCOSE BLD-MCNC: 105 MG/DL (ref 70–99)
HCT VFR BLD CALC: 36.3 % (ref 40.5–52.5)
HEMOGLOBIN: 11.5 G/DL (ref 13.5–17.5)
LIPASE: 24 U/L (ref 13–60)
LYMPHOCYTES ABSOLUTE: 1.2 K/UL (ref 1–5.1)
LYMPHOCYTES RELATIVE PERCENT: 27.9 %
MAGNESIUM: 2.1 MG/DL (ref 1.8–2.4)
MCH RBC QN AUTO: 26.2 PG (ref 26–34)
MCHC RBC AUTO-ENTMCNC: 31.8 G/DL (ref 31–36)
MCV RBC AUTO: 82.3 FL (ref 80–100)
MONOCYTES ABSOLUTE: 0.4 K/UL (ref 0–1.3)
MONOCYTES RELATIVE PERCENT: 8.4 %
NEUTROPHILS ABSOLUTE: 2.5 K/UL (ref 1.7–7.7)
NEUTROPHILS RELATIVE PERCENT: 58.8 %
PDW BLD-RTO: 21.6 % (ref 12.4–15.4)
PLATELET # BLD: 336 K/UL (ref 135–450)
PMV BLD AUTO: 6.6 FL (ref 5–10.5)
POTASSIUM REFLEX MAGNESIUM: 3.5 MMOL/L (ref 3.5–5.1)
RBC # BLD: 4.4 M/UL (ref 4.2–5.9)
SODIUM BLD-SCNC: 135 MMOL/L (ref 136–145)
TOTAL PROTEIN: 7.7 G/DL (ref 6.4–8.2)
WBC # BLD: 4.2 K/UL (ref 4–11)

## 2023-01-15 PROCEDURE — 6360000002 HC RX W HCPCS: Performed by: PHYSICIAN ASSISTANT

## 2023-01-15 PROCEDURE — 85025 COMPLETE CBC W/AUTO DIFF WBC: CPT

## 2023-01-15 PROCEDURE — 96374 THER/PROPH/DIAG INJ IV PUSH: CPT

## 2023-01-15 PROCEDURE — 99284 EMERGENCY DEPT VISIT MOD MDM: CPT

## 2023-01-15 PROCEDURE — 83690 ASSAY OF LIPASE: CPT

## 2023-01-15 PROCEDURE — 80053 COMPREHEN METABOLIC PANEL: CPT

## 2023-01-15 PROCEDURE — 2580000003 HC RX 258: Performed by: PHYSICIAN ASSISTANT

## 2023-01-15 PROCEDURE — 83735 ASSAY OF MAGNESIUM: CPT

## 2023-01-15 RX ORDER — ONDANSETRON 2 MG/ML
4 INJECTION INTRAMUSCULAR; INTRAVENOUS ONCE
Status: COMPLETED | OUTPATIENT
Start: 2023-01-15 | End: 2023-01-15

## 2023-01-15 RX ORDER — 0.9 % SODIUM CHLORIDE 0.9 %
1000 INTRAVENOUS SOLUTION INTRAVENOUS ONCE
Status: COMPLETED | OUTPATIENT
Start: 2023-01-15 | End: 2023-01-15

## 2023-01-15 RX ADMIN — SODIUM CHLORIDE 1000 ML: 9 INJECTION, SOLUTION INTRAVENOUS at 18:31

## 2023-01-15 RX ADMIN — ONDANSETRON 4 MG: 2 INJECTION INTRAMUSCULAR; INTRAVENOUS at 18:31

## 2023-01-15 ASSESSMENT — PAIN - FUNCTIONAL ASSESSMENT: PAIN_FUNCTIONAL_ASSESSMENT: NONE - DENIES PAIN

## 2023-01-15 NOTE — DISCHARGE INSTRUCTIONS
Laboratory studies obtained showing no concerning abnormalities. IV fluids given. Zofran given for nausea. Food and drink provided.

## 2023-01-16 NOTE — ED NOTES
Pt d/c. YUNIOR reviewed and signed, all questions answered. Escorted to cab by this RN. NAD noted.      Juan Ramos RN  01/15/23 1928

## 2023-01-16 NOTE — ED PROVIDER NOTES
[address] [facility]  ED  EMERGENCY DEPARTMENT ENCOUNTER        Pt Name: Radha Ayala  MRN: [id number]  [place] 1985  Date of evaluation: 1/15/2023  Provider: Michael Moy PA-C  PCP: No primary care provider on file. Note Started: 7:41 PM EST 1/15/23      SERGEI. I have evaluated this patient. My supervising physician was available for consultation. CHIEF COMPLAINT       Chief Complaint   Patient presents with    Alcohol Intoxication     Found intoxicated on the street. States he drinks every day and only had 4 beers. Appears intoxicated and slurring words at triage. HISTORY OF PRESENT ILLNESS: 1 or more Elements     History From: The patient    Limitations to history : Intoxication    Radha Ayala is a 42 y.o. male who presents by EMS with intoxication. The patient is intoxicated by his admission. Drinking beer. The patient indicates no pain or complaints. Requesting cab voucher to get out of here. I did have a conversation with the patient and he will allow brief evaluation, fluids and food. He has no pain complaint. He is awake he is alert. He is intoxicated. Nursing Notes were all reviewed and agreed with or any disagreements were addressed in the HPI. REVIEW OF SYSTEMS :      Review of Systems    Positives and Pertinent negatives as per HPI. SURGICAL HISTORY     Past Surgical History:   Procedure Laterality Date    FRACTURE SURGERY  ankle       CURRENTMEDICATIONS     There are no discharge medications for this patient. ALLERGIES     Patient has no known allergies. FAMILYHISTORY     History reviewed. No pertinent family history. SOCIAL HISTORY       Social History     Tobacco Use    Smoking status: Never    Smokeless tobacco: Never   Substance Use Topics    Alcohol use:  Yes     Alcohol/week: 6.0 standard drinks     Types: 6 Cans of beer per week     Comment: daily     Drug use: No       SCREENINGS        Paul Coma Scale  Eye Opening: Spontaneous  Best Verbal Response: Oriented  Best Motor Response: Obeys commands  Paul Coma Scale Score: 15                CIWA Assessment  BP: (!) 123/99  Heart Rate: 94           PHYSICAL EXAM  1 or more Elements     ED Triage Vitals [01/15/23 1812]   BP Temp Temp Source Heart Rate Resp SpO2 Height Weight   (!) 123/99 97.9 °F (36.6 °C) Oral 95 18 98 % 5' 7\" (1.702 m) 155 lb (70.3 kg)       Physical Exam  Vitals and nursing note reviewed. Constitutional:       Appearance: Normal appearance. He is well-developed and normal weight. HENT:      Head: Normocephalic and atraumatic. Right Ear: External ear normal.      Left Ear: External ear normal.      Mouth/Throat:      Mouth: Mucous membranes are moist.      Pharynx: Oropharynx is clear. Eyes:      General: No scleral icterus. Right eye: No discharge. Left eye: No discharge. Extraocular Movements: Extraocular movements intact. Conjunctiva/sclera: Conjunctivae normal.      Pupils: Pupils are equal, round, and reactive to light. Cardiovascular:      Rate and Rhythm: Normal rate and regular rhythm. Heart sounds: Normal heart sounds. Pulmonary:      Effort: Pulmonary effort is normal.      Breath sounds: Normal breath sounds. Abdominal:      General: Abdomen is flat. Bowel sounds are normal.      Palpations: Abdomen is soft. Tenderness: There is no abdominal tenderness. Musculoskeletal:         General: Normal range of motion. Cervical back: Normal range of motion and neck supple. Right lower leg: No edema. Left lower leg: No edema. Skin:     General: Skin is warm and dry. Neurological:      General: No focal deficit present. Mental Status: He is alert and oriented to person, place, and time. Mental status is at baseline. Psychiatric:         Mood and Affect: Mood normal.         Behavior: Behavior normal.         Thought Content:  Thought content normal.         Judgment: Judgment normal. DIAGNOSTIC RESULTS   LABS:    Labs Reviewed   COMPREHENSIVE METABOLIC PANEL W/ REFLEX TO MG FOR LOW K - Abnormal; Notable for the following components:       Result Value    Sodium 135 (*)     Chloride 96 (*)     Anion Gap 18 (*)     Glucose 105 (*)     BUN 4 (*)     Creatinine 0.7 (*)     All other components within normal limits   CBC WITH AUTO DIFFERENTIAL - Abnormal; Notable for the following components:    Hemoglobin 11.5 (*)     Hematocrit 36.3 (*)     RDW 21.6 (*)     All other components within normal limits   LIPASE   MAGNESIUM       When ordered only abnormal lab results are displayed. All other labs were within normal range or not returned as of this dictation. EKG: When ordered, EKG's are interpreted by the Emergency Department Physician in the absence of a cardiologist.  Please see their note for interpretation of EKG. RADIOLOGY:   Non-plain film images such as CT, Ultrasound and MRI are read by the radiologist. Plain radiographic images are visualized and preliminarily interpreted by the ED Provider with the below findings:      Interpretation per the Radiologist below, if available at the time of this note:    No orders to display     No results found. No results found. PROCEDURES   Unless otherwise noted below, none     Procedures    CRITICAL CARE TIME (.cctime)       PAST MEDICAL HISTORY      has no past medical history on file.      EMERGENCY DEPARTMENT COURSE and DIFFERENTIAL DIAGNOSIS/MDM:   Vitals:    Vitals:    01/15/23 1812 01/15/23 1920   BP: (!) 123/99    Pulse: 95 94   Resp: 18    Temp: 97.9 °F (36.6 °C)    TempSrc: Oral    SpO2: 98%    Weight: 155 lb (70.3 kg)    Height: 5' 7\" (1.702 m)        Patient was given the following medications:  Medications   0.9 % sodium chloride bolus (0 mLs IntraVENous Stopped 1/15/23 1931)   ondansetron (ZOFRAN) injection 4 mg (4 mg IntraVENous Given 1/15/23 1831)             Is this patient to be included in the SEP-1 Core Measure due to severe sepsis or septic shock? No   Exclusion criteria - the patient is NOT to be included for SEP-1 Core Measure due to: Infection is not suspected    Chronic Conditions affecting care: Not known   has no past medical history on file. CONSULTS: (Who and What was discussed)  None      Social Determinants : None    Records Reviewed (Source): None    CC/HPI Summary, DDx, ED Course, and Reassessment: This patient presenting by EMS with intoxication without medical complaints. Patient requesting cab voucher to go home. The patient allowed evaluation. CBC, CMP, lipase all normal or within normal limits. The patient did receive NaCl 1 L for hydration and dilution. Patient was given food and drink. He feels improved with hydration and nutrition. No pain complaints yet voiced. Patient will be provided transportation home. Disposition Considerations (tests considered but not done, Admit vs D/C, Shared Decision Making, Pt Expectation of Test or Tx.): This patient being discharged to home with diagnosis alcohol intoxication by his admission. No physical complaints. Patient was given NaCl 1 L. Laboratory studies normal or within normal limits. Patient given food and drink. Patient given assistance getting home. I am the Primary Clinician of Record. FINAL IMPRESSION      1. Alcoholic intoxication without complication [facility])          DISPOSITION/PLAN     DISPOSITION Decision To Discharge 01/15/2023 06:58:56 PM      PATIENT REFERRED TO:  Your healthcare provider    Schedule an appointment as soon as possible for a visit in 3 days  As needed    [facility]  ED  [address]  Go to   If symptoms worsen      DISCHARGE MEDICATIONS:  There are no discharge medications for this patient. DISCONTINUED MEDICATIONS:  There are no discharge medications for this patient.              (Please note that portions of this note were completed with a voice recognition program.  Efforts were made to edit the dictations but occasionally words are mis-transcribed. )    Mary Osborne PA-C (electronically signed)       Mary Osborne PA-C  01/15/23 1945

## 2023-04-08 PROBLEM — F10.10 ETOH ABUSE: Status: ACTIVE | Noted: 2023-04-08

## 2023-04-09 PROBLEM — R10.13 EPIGASTRIC PAIN: Status: ACTIVE | Noted: 2023-04-09

## 2023-04-09 PROBLEM — K70.10 ALCOHOLIC HEPATITIS: Status: ACTIVE | Noted: 2023-04-09

## 2023-04-11 PROBLEM — E87.6 HYPOKALEMIA: Status: ACTIVE | Noted: 2023-04-11

## 2023-04-11 PROBLEM — E83.42 HYPOMAGNESEMIA: Status: ACTIVE | Noted: 2023-04-11

## 2024-08-28 ENCOUNTER — HOSPITAL ENCOUNTER (INPATIENT)
Age: 39
LOS: 1 days | Discharge: HOME | DRG: 897 | End: 2024-08-29
Attending: EMERGENCY MEDICINE | Admitting: INTERNAL MEDICINE
Payer: MEDICAID

## 2024-08-28 ENCOUNTER — APPOINTMENT (OUTPATIENT)
Dept: CT IMAGING | Age: 39
DRG: 897 | End: 2024-08-28
Payer: MEDICAID

## 2024-08-28 DIAGNOSIS — F10.930 ALCOHOL WITHDRAWAL SYNDROME WITHOUT COMPLICATION (HCC): Primary | ICD-10-CM

## 2024-08-28 DIAGNOSIS — R25.1 TREMORS OF NERVOUS SYSTEM: ICD-10-CM

## 2024-08-28 DIAGNOSIS — R10.84 GENERALIZED ABDOMINAL PAIN: ICD-10-CM

## 2024-08-28 DIAGNOSIS — R74.8 ELEVATED ALKALINE PHOSPHATASE LEVEL: ICD-10-CM

## 2024-08-28 DIAGNOSIS — F10.230 ALCOHOL DEPENDENCE WITH UNCOMPLICATED WITHDRAWAL (HCC): ICD-10-CM

## 2024-08-28 PROBLEM — F10.239 ALCOHOL DEPENDENCE WITH WITHDRAWAL (HCC): Status: ACTIVE | Noted: 2024-08-28

## 2024-08-28 LAB
ALBUMIN SERPL-MCNC: 4.1 G/DL (ref 3.4–5)
ALBUMIN/GLOB SERPL: 1.1 {RATIO} (ref 1.1–2.2)
ALP SERPL-CCNC: 135 U/L (ref 40–129)
ALT SERPL-CCNC: 13 U/L (ref 10–40)
ANION GAP SERPL CALCULATED.3IONS-SCNC: 14 MMOL/L (ref 3–16)
AST SERPL-CCNC: 30 U/L (ref 15–37)
BASOPHILS # BLD: 0.1 K/UL (ref 0–0.2)
BASOPHILS NFR BLD: 1.2 %
BILIRUB SERPL-MCNC: 0.6 MG/DL (ref 0–1)
BILIRUB UR QL STRIP.AUTO: NEGATIVE
BUN SERPL-MCNC: 6 MG/DL (ref 7–20)
CALCIUM SERPL-MCNC: 9 MG/DL (ref 8.3–10.6)
CHLORIDE SERPL-SCNC: 101 MMOL/L (ref 99–110)
CLARITY UR: CLEAR
CO2 SERPL-SCNC: 19 MMOL/L (ref 21–32)
COLOR UR: YELLOW
CREAT SERPL-MCNC: 0.7 MG/DL (ref 0.9–1.3)
DEPRECATED RDW RBC AUTO: 17 % (ref 12.4–15.4)
EOSINOPHIL # BLD: 0.1 K/UL (ref 0–0.6)
EOSINOPHIL NFR BLD: 2.4 %
ETHANOLAMINE SERPL-MCNC: NORMAL MG/DL (ref 0–0.08)
GFR SERPLBLD CREATININE-BSD FMLA CKD-EPI: >90 ML/MIN/{1.73_M2}
GLUCOSE SERPL-MCNC: 116 MG/DL (ref 70–99)
GLUCOSE UR STRIP.AUTO-MCNC: NEGATIVE MG/DL
HCT VFR BLD AUTO: 44.4 % (ref 40.5–52.5)
HGB BLD-MCNC: 14.2 G/DL (ref 13.5–17.5)
HGB UR QL STRIP.AUTO: NEGATIVE
KETONES UR STRIP.AUTO-MCNC: NEGATIVE MG/DL
LEUKOCYTE ESTERASE UR QL STRIP.AUTO: NEGATIVE
LIPASE SERPL-CCNC: 24 U/L (ref 13–60)
LYMPHOCYTES # BLD: 1.1 K/UL (ref 1–5.1)
LYMPHOCYTES NFR BLD: 17.8 %
MCH RBC QN AUTO: 25 PG (ref 26–34)
MCHC RBC AUTO-ENTMCNC: 32 G/DL (ref 31–36)
MCV RBC AUTO: 78.1 FL (ref 80–100)
MONOCYTES # BLD: 0.5 K/UL (ref 0–1.3)
MONOCYTES NFR BLD: 7.6 %
NEUTROPHILS # BLD: 4.2 K/UL (ref 1.7–7.7)
NEUTROPHILS NFR BLD: 71 %
NITRITE UR QL STRIP.AUTO: NEGATIVE
PH UR STRIP.AUTO: 7 [PH] (ref 5–8)
PLATELET # BLD AUTO: 295 K/UL (ref 135–450)
PMV BLD AUTO: 6.7 FL (ref 5–10.5)
POTASSIUM SERPL-SCNC: 4 MMOL/L (ref 3.5–5.1)
PROT SERPL-MCNC: 7.8 G/DL (ref 6.4–8.2)
PROT UR STRIP.AUTO-MCNC: NEGATIVE MG/DL
RBC # BLD AUTO: 5.68 M/UL (ref 4.2–5.9)
SODIUM SERPL-SCNC: 134 MMOL/L (ref 136–145)
SP GR UR STRIP.AUTO: <=1.005 (ref 1–1.03)
TROPONIN, HIGH SENSITIVITY: <6 NG/L (ref 0–22)
UA DIPSTICK W REFLEX MICRO PNL UR: NORMAL
URN SPEC COLLECT METH UR: NORMAL
UROBILINOGEN UR STRIP-ACNC: 0.2 E.U./DL
WBC # BLD AUTO: 5.9 K/UL (ref 4–11)

## 2024-08-28 PROCEDURE — 6370000000 HC RX 637 (ALT 250 FOR IP): Performed by: EMERGENCY MEDICINE

## 2024-08-28 PROCEDURE — 74176 CT ABD & PELVIS W/O CONTRAST: CPT

## 2024-08-28 PROCEDURE — 82077 ASSAY SPEC XCP UR&BREATH IA: CPT

## 2024-08-28 PROCEDURE — 81003 URINALYSIS AUTO W/O SCOPE: CPT

## 2024-08-28 PROCEDURE — 85025 COMPLETE CBC W/AUTO DIFF WBC: CPT

## 2024-08-28 PROCEDURE — 84484 ASSAY OF TROPONIN QUANT: CPT

## 2024-08-28 PROCEDURE — 96375 TX/PRO/DX INJ NEW DRUG ADDON: CPT

## 2024-08-28 PROCEDURE — 96374 THER/PROPH/DIAG INJ IV PUSH: CPT

## 2024-08-28 PROCEDURE — 2580000003 HC RX 258: Performed by: EMERGENCY MEDICINE

## 2024-08-28 PROCEDURE — 6360000002 HC RX W HCPCS: Performed by: EMERGENCY MEDICINE

## 2024-08-28 PROCEDURE — 83690 ASSAY OF LIPASE: CPT

## 2024-08-28 PROCEDURE — HZ2ZZZZ DETOXIFICATION SERVICES FOR SUBSTANCE ABUSE TREATMENT: ICD-10-PCS | Performed by: INTERNAL MEDICINE

## 2024-08-28 PROCEDURE — 1200000000 HC SEMI PRIVATE

## 2024-08-28 PROCEDURE — 99285 EMERGENCY DEPT VISIT HI MDM: CPT

## 2024-08-28 PROCEDURE — 80053 COMPREHEN METABOLIC PANEL: CPT

## 2024-08-28 PROCEDURE — 93005 ELECTROCARDIOGRAM TRACING: CPT | Performed by: EMERGENCY MEDICINE

## 2024-08-28 RX ORDER — ONDANSETRON 4 MG/1
4 TABLET, ORALLY DISINTEGRATING ORAL EVERY 8 HOURS PRN
Status: CANCELLED | OUTPATIENT
Start: 2024-08-28

## 2024-08-28 RX ORDER — ENOXAPARIN SODIUM 100 MG/ML
40 INJECTION SUBCUTANEOUS DAILY
Status: CANCELLED | OUTPATIENT
Start: 2024-08-29

## 2024-08-28 RX ORDER — SODIUM CHLORIDE 9 MG/ML
INJECTION, SOLUTION INTRAVENOUS PRN
Status: CANCELLED | OUTPATIENT
Start: 2024-08-28

## 2024-08-28 RX ORDER — ONDANSETRON 2 MG/ML
4 INJECTION INTRAMUSCULAR; INTRAVENOUS EVERY 6 HOURS PRN
Status: CANCELLED | OUTPATIENT
Start: 2024-08-28

## 2024-08-28 RX ORDER — LANOLIN ALCOHOL/MO/W.PET/CERES
3 CREAM (GRAM) TOPICAL NIGHTLY
Status: CANCELLED | OUTPATIENT
Start: 2024-08-29

## 2024-08-28 RX ORDER — KETOROLAC TROMETHAMINE 30 MG/ML
30 INJECTION, SOLUTION INTRAMUSCULAR; INTRAVENOUS ONCE
Status: COMPLETED | OUTPATIENT
Start: 2024-08-28 | End: 2024-08-28

## 2024-08-28 RX ORDER — SODIUM CHLORIDE 9 MG/ML
INJECTION, SOLUTION INTRAVENOUS CONTINUOUS
Status: DISCONTINUED | OUTPATIENT
Start: 2024-08-28 | End: 2024-08-29 | Stop reason: HOSPADM

## 2024-08-28 RX ORDER — ACETAMINOPHEN 650 MG/1
650 SUPPOSITORY RECTAL EVERY 6 HOURS PRN
Status: CANCELLED | OUTPATIENT
Start: 2024-08-28

## 2024-08-28 RX ORDER — ACETAMINOPHEN 325 MG/1
650 TABLET ORAL EVERY 6 HOURS PRN
Status: CANCELLED | OUTPATIENT
Start: 2024-08-28

## 2024-08-28 RX ORDER — SODIUM CHLORIDE 0.9 % (FLUSH) 0.9 %
5-40 SYRINGE (ML) INJECTION PRN
Status: CANCELLED | OUTPATIENT
Start: 2024-08-28

## 2024-08-28 RX ORDER — SODIUM CHLORIDE 0.9 % (FLUSH) 0.9 %
5-40 SYRINGE (ML) INJECTION EVERY 12 HOURS SCHEDULED
Status: CANCELLED | OUTPATIENT
Start: 2024-08-29

## 2024-08-28 RX ORDER — LORAZEPAM 0.5 MG/1
1 TABLET ORAL ONCE
Status: COMPLETED | OUTPATIENT
Start: 2024-08-28 | End: 2024-08-28

## 2024-08-28 RX ORDER — GAUZE BANDAGE 2" X 2"
100 BANDAGE TOPICAL DAILY
Status: DISCONTINUED | OUTPATIENT
Start: 2024-08-28 | End: 2024-08-28 | Stop reason: ALTCHOICE

## 2024-08-28 RX ORDER — POLYETHYLENE GLYCOL 3350 17 G/17G
17 POWDER, FOR SOLUTION ORAL DAILY PRN
Status: CANCELLED | OUTPATIENT
Start: 2024-08-28

## 2024-08-28 RX ORDER — ONDANSETRON 2 MG/ML
4 INJECTION INTRAMUSCULAR; INTRAVENOUS EVERY 30 MIN PRN
Status: DISCONTINUED | OUTPATIENT
Start: 2024-08-28 | End: 2024-08-29

## 2024-08-28 RX ADMIN — KETOROLAC TROMETHAMINE 30 MG: 30 INJECTION INTRAMUSCULAR; INTRAVENOUS at 14:29

## 2024-08-28 RX ADMIN — Medication 100 MG: at 15:51

## 2024-08-28 RX ADMIN — LORAZEPAM 1 MG: 0.5 TABLET ORAL at 15:51

## 2024-08-28 RX ADMIN — SODIUM CHLORIDE: 9 INJECTION, SOLUTION INTRAVENOUS at 14:28

## 2024-08-28 RX ADMIN — ONDANSETRON 4 MG: 2 INJECTION INTRAMUSCULAR; INTRAVENOUS at 14:29

## 2024-08-28 ASSESSMENT — ENCOUNTER SYMPTOMS
PHOTOPHOBIA: 0
STRIDOR: 0
ABDOMINAL PAIN: 1
EYE DISCHARGE: 0
COUGH: 0
BLOOD IN STOOL: 0
CONSTIPATION: 0
BACK PAIN: 0
NAUSEA: 1
CHEST TIGHTNESS: 0
EYE PAIN: 0
SORE THROAT: 0
SINUS PRESSURE: 0
ANAL BLEEDING: 0
TROUBLE SWALLOWING: 0
ABDOMINAL DISTENTION: 0
WHEEZING: 0
EYE ITCHING: 0
RHINORRHEA: 0
VOICE CHANGE: 0
EYES NEGATIVE: 1
FACIAL SWELLING: 0
EYE REDNESS: 0
DIARRHEA: 0
VOMITING: 1
RESPIRATORY NEGATIVE: 1
DIARRHEA: 1
SHORTNESS OF BREATH: 0

## 2024-08-28 ASSESSMENT — PAIN - FUNCTIONAL ASSESSMENT: PAIN_FUNCTIONAL_ASSESSMENT: NONE - DENIES PAIN

## 2024-08-28 NOTE — ED NOTES
Report given to SONALI Mason on PCU. Pt updated on plan of care. Pt denies any needs at this time.

## 2024-08-28 NOTE — ED PROVIDER NOTES
Terry Treadwell is a 38 year old male who presents to the ED with nausea, vomiting, epigastric abdominal pain and a headache. He is an alcoholic who drinks 6 plus cans of vodka daily. He started to feel ill yesterday. He is living at [facility] where he is in a detox program. He was unable to work yesterday and today, and if he is unable to work he is unable to live there. His last drink was Monday. He is interested in medically assisted alcohol withdrawal and treatment.      BP (!) 119/90   Pulse 87   Temp 98 °F (36.7 °C) (Oral)   Resp 18   Ht 1.651 m (5' 5\")   Wt 84.8 kg (186 lb 15.2 oz)   SpO2 96%   BMI 31.11 kg/m²     I have reviewed the following from the nursing documentation:      Prior to Admission medications    Not on File       Allergies as of 08/28/2024 - Fully Reviewed 08/28/2024   Allergen Reaction Noted    Amoxicillin  04/08/2023       Past Medical History:   Diagnosis Date    Alcohol abuse         Surgical History:   Past Surgical History:   Procedure Laterality Date    FRACTURE SURGERY  ankle        Family History:  History reviewed. No pertinent family history.    Social History     Socioeconomic History    Marital status: Single     Spouse name: Not on file    Number of children: Not on file    Years of education: Not on file    Highest education level: Not on file   Occupational History    Not on file   Tobacco Use    Smoking status: Never    Smokeless tobacco: Never   Vaping Use    Vaping status: Every Day    Substances: Nicotine   Substance and Sexual Activity    Alcohol use: Yes     Alcohol/week: 6.0 standard drinks of alcohol     Types: 6 Cans of beer per week     Comment: daily     Drug use: No    Sexual activity: Not on file   Other Topics Concern    Not on file   Social History Narrative    Not on file     Social Determinants of Health     Financial Resource Strain: Not on file   Food Insecurity: Not on file   Transportation Needs: Not on file   Physical Activity: Not on  file   Stress: Not on file   Social Connections: Not on file   Intimate Partner Violence: Not on file   Housing Stability: Not on file         Review of Systems   Constitutional:  Negative for activity change, appetite change, chills, diaphoresis, fatigue and fever.   HENT: Negative.  Negative for congestion, dental problem, ear pain, facial swelling, rhinorrhea, sinus pressure, sneezing, sore throat, tinnitus, trouble swallowing and voice change.    Eyes:  Negative for photophobia, pain, discharge, redness, itching and visual disturbance.   Respiratory:  Negative for cough, chest tightness, shortness of breath, wheezing and stridor.    Cardiovascular:  Negative for chest pain, palpitations and leg swelling.   Gastrointestinal:  Positive for abdominal pain (epigastric), nausea and vomiting. Negative for abdominal distention, anal bleeding, blood in stool, constipation and diarrhea.   Genitourinary:  Negative for difficulty urinating, dysuria, frequency, hematuria, penile discharge, testicular pain and urgency.   Musculoskeletal:  Negative for back pain, joint swelling, neck pain and neck stiffness.   Skin:  Negative for rash and wound.   Neurological:  Positive for headaches. Negative for dizziness, syncope, facial asymmetry, speech difficulty, weakness and numbness.   Hematological:  Does not bruise/bleed easily.   Psychiatric/Behavioral:  Negative for agitation, confusion, hallucinations, self-injury, sleep disturbance and suicidal ideas. The patient is not nervous/anxious.    All other systems reviewed and are negative.       Physical Exam  Vitals and nursing note reviewed.   Constitutional:       General: He is not in acute distress.     Appearance: He is well-developed.   HENT:      Head: Normocephalic and atraumatic.      Right Ear: External ear normal.      Left Ear: External ear normal.      Nose: Nose normal.      Mouth/Throat:      Mouth: Oropharynx is clear and moist.      Pharynx: No oropharyngeal

## 2024-08-29 VITALS
RESPIRATION RATE: 16 BRPM | OXYGEN SATURATION: 98 % | BODY MASS INDEX: 31 KG/M2 | SYSTOLIC BLOOD PRESSURE: 122 MMHG | HEART RATE: 89 BPM | TEMPERATURE: 98.4 F | WEIGHT: 186.07 LBS | DIASTOLIC BLOOD PRESSURE: 84 MMHG | HEIGHT: 65 IN

## 2024-08-29 LAB
ALBUMIN SERPL-MCNC: 3.7 G/DL (ref 3.4–5)
ALBUMIN/GLOB SERPL: 1.2 {RATIO} (ref 1.1–2.2)
ALP SERPL-CCNC: 116 U/L (ref 40–129)
ALT SERPL-CCNC: 11 U/L (ref 10–40)
AMPHETAMINES UR QL SCN>1000 NG/ML: NORMAL
ANION GAP SERPL CALCULATED.3IONS-SCNC: 12 MMOL/L (ref 3–16)
AST SERPL-CCNC: 24 U/L (ref 15–37)
BARBITURATES UR QL SCN>200 NG/ML: NORMAL
BASOPHILS # BLD: 0.1 K/UL (ref 0–0.2)
BASOPHILS NFR BLD: 0.9 %
BENZODIAZ UR QL SCN>200 NG/ML: NORMAL
BILIRUB SERPL-MCNC: 0.4 MG/DL (ref 0–1)
BUN SERPL-MCNC: 7 MG/DL (ref 7–20)
CALCIUM SERPL-MCNC: 8.5 MG/DL (ref 8.3–10.6)
CANNABINOIDS UR QL SCN>50 NG/ML: NORMAL
CHLORIDE SERPL-SCNC: 104 MMOL/L (ref 99–110)
CO2 SERPL-SCNC: 23 MMOL/L (ref 21–32)
COCAINE UR QL SCN: NORMAL
CREAT SERPL-MCNC: 0.9 MG/DL (ref 0.9–1.3)
DEPRECATED RDW RBC AUTO: 16.6 % (ref 12.4–15.4)
DRUG SCREEN COMMENT UR-IMP: NORMAL
EKG ATRIAL RATE: 82 BPM
EKG DIAGNOSIS: NORMAL
EKG P AXIS: 48 DEGREES
EKG P-R INTERVAL: 106 MS
EKG Q-T INTERVAL: 398 MS
EKG QRS DURATION: 88 MS
EKG QTC CALCULATION (BAZETT): 464 MS
EKG R AXIS: 32 DEGREES
EKG T AXIS: -4 DEGREES
EKG VENTRICULAR RATE: 82 BPM
EOSINOPHIL # BLD: 0.3 K/UL (ref 0–0.6)
EOSINOPHIL NFR BLD: 4.7 %
ETHANOLAMINE SERPL-MCNC: NORMAL MG/DL (ref 0–0.08)
FENTANYL SCREEN, URINE: NORMAL
FERRITIN SERPL IA-MCNC: 60.4 NG/ML (ref 30–400)
GFR SERPLBLD CREATININE-BSD FMLA CKD-EPI: >90 ML/MIN/{1.73_M2}
GLUCOSE SERPL-MCNC: 93 MG/DL (ref 70–99)
HCT VFR BLD AUTO: 42.2 % (ref 40.5–52.5)
HGB BLD-MCNC: 13.4 G/DL (ref 13.5–17.5)
IRON SATN MFR SERPL: 19 % (ref 20–50)
IRON SERPL-MCNC: 72 UG/DL (ref 59–158)
LYMPHOCYTES # BLD: 2 K/UL (ref 1–5.1)
LYMPHOCYTES NFR BLD: 34.4 %
MCH RBC QN AUTO: 25.5 PG (ref 26–34)
MCHC RBC AUTO-ENTMCNC: 31.7 G/DL (ref 31–36)
MCV RBC AUTO: 80.3 FL (ref 80–100)
METHADONE UR QL SCN>300 NG/ML: NORMAL
MONOCYTES # BLD: 0.5 K/UL (ref 0–1.3)
MONOCYTES NFR BLD: 9.2 %
NEUTROPHILS # BLD: 2.9 K/UL (ref 1.7–7.7)
NEUTROPHILS NFR BLD: 50.8 %
OPIATES UR QL SCN>300 NG/ML: NORMAL
OXYCODONE UR QL SCN: NORMAL
PCP UR QL SCN>25 NG/ML: NORMAL
PH UR STRIP: 6 [PH]
PHOSPHATE SERPL-MCNC: 3.7 MG/DL (ref 2.5–4.9)
PLATELET # BLD AUTO: 244 K/UL (ref 135–450)
PMV BLD AUTO: 7.3 FL (ref 5–10.5)
POTASSIUM SERPL-SCNC: 3.9 MMOL/L (ref 3.5–5.1)
PROT SERPL-MCNC: 6.7 G/DL (ref 6.4–8.2)
RBC # BLD AUTO: 5.26 M/UL (ref 4.2–5.9)
REASON FOR REJECTION: NORMAL
REJECTED TEST: NORMAL
SODIUM SERPL-SCNC: 139 MMOL/L (ref 136–145)
TIBC SERPL-MCNC: 374 UG/DL (ref 260–445)
TSH SERPL DL<=0.005 MIU/L-ACNC: 2.16 UIU/ML (ref 0.27–4.2)
VIT B12 SERPL-MCNC: 353 PG/ML (ref 211–911)
WBC # BLD AUTO: 5.7 K/UL (ref 4–11)

## 2024-08-29 PROCEDURE — 97161 PT EVAL LOW COMPLEX 20 MIN: CPT

## 2024-08-29 PROCEDURE — 97530 THERAPEUTIC ACTIVITIES: CPT

## 2024-08-29 PROCEDURE — 84443 ASSAY THYROID STIM HORMONE: CPT

## 2024-08-29 PROCEDURE — 83550 IRON BINDING TEST: CPT

## 2024-08-29 PROCEDURE — 83540 ASSAY OF IRON: CPT

## 2024-08-29 PROCEDURE — 97165 OT EVAL LOW COMPLEX 30 MIN: CPT

## 2024-08-29 PROCEDURE — 6360000002 HC RX W HCPCS

## 2024-08-29 PROCEDURE — 80307 DRUG TEST PRSMV CHEM ANLYZR: CPT

## 2024-08-29 PROCEDURE — 97116 GAIT TRAINING THERAPY: CPT

## 2024-08-29 PROCEDURE — 2580000003 HC RX 258

## 2024-08-29 PROCEDURE — 82728 ASSAY OF FERRITIN: CPT

## 2024-08-29 PROCEDURE — 85025 COMPLETE CBC W/AUTO DIFF WBC: CPT

## 2024-08-29 PROCEDURE — 6370000000 HC RX 637 (ALT 250 FOR IP)

## 2024-08-29 PROCEDURE — 80053 COMPREHEN METABOLIC PANEL: CPT

## 2024-08-29 PROCEDURE — 84100 ASSAY OF PHOSPHORUS: CPT

## 2024-08-29 PROCEDURE — 93010 ELECTROCARDIOGRAM REPORT: CPT | Performed by: INTERNAL MEDICINE

## 2024-08-29 PROCEDURE — 82077 ASSAY SPEC XCP UR&BREATH IA: CPT

## 2024-08-29 PROCEDURE — 82607 VITAMIN B-12: CPT

## 2024-08-29 PROCEDURE — 36415 COLL VENOUS BLD VENIPUNCTURE: CPT

## 2024-08-29 RX ORDER — ENOXAPARIN SODIUM 100 MG/ML
40 INJECTION SUBCUTANEOUS DAILY
Status: DISCONTINUED | OUTPATIENT
Start: 2024-08-29 | End: 2024-08-29 | Stop reason: HOSPADM

## 2024-08-29 RX ORDER — THIAMINE HYDROCHLORIDE 100 MG/ML
100 INJECTION, SOLUTION INTRAMUSCULAR; INTRAVENOUS DAILY
Status: DISCONTINUED | OUTPATIENT
Start: 2024-08-29 | End: 2024-08-29 | Stop reason: HOSPADM

## 2024-08-29 RX ORDER — ONDANSETRON 2 MG/ML
4 INJECTION INTRAMUSCULAR; INTRAVENOUS EVERY 6 HOURS PRN
Status: DISCONTINUED | OUTPATIENT
Start: 2024-08-29 | End: 2024-08-29 | Stop reason: HOSPADM

## 2024-08-29 RX ORDER — LORAZEPAM 2 MG/ML
3 INJECTION INTRAMUSCULAR
Status: DISCONTINUED | OUTPATIENT
Start: 2024-08-29 | End: 2024-08-29 | Stop reason: HOSPADM

## 2024-08-29 RX ORDER — SODIUM CHLORIDE, SODIUM LACTATE, POTASSIUM CHLORIDE, CALCIUM CHLORIDE 600; 310; 30; 20 MG/100ML; MG/100ML; MG/100ML; MG/100ML
INJECTION, SOLUTION INTRAVENOUS CONTINUOUS
Status: DISCONTINUED | OUTPATIENT
Start: 2024-08-29 | End: 2024-08-29

## 2024-08-29 RX ORDER — SODIUM CHLORIDE 9 MG/ML
INJECTION, SOLUTION INTRAVENOUS PRN
Status: DISCONTINUED | OUTPATIENT
Start: 2024-08-29 | End: 2024-08-29 | Stop reason: HOSPADM

## 2024-08-29 RX ORDER — GAUZE BANDAGE 2" X 2"
100 BANDAGE TOPICAL DAILY
Status: DISCONTINUED | OUTPATIENT
Start: 2024-08-29 | End: 2024-08-29

## 2024-08-29 RX ORDER — LORAZEPAM 1 MG/1
4 TABLET ORAL
Status: DISCONTINUED | OUTPATIENT
Start: 2024-08-29 | End: 2024-08-29 | Stop reason: HOSPADM

## 2024-08-29 RX ORDER — FOLIC ACID 1 MG/1
1 TABLET ORAL DAILY
Qty: 30 TABLET | Refills: 0 | Status: SHIPPED | OUTPATIENT
Start: 2024-08-30

## 2024-08-29 RX ORDER — ACETAMINOPHEN 650 MG/1
650 SUPPOSITORY RECTAL EVERY 6 HOURS PRN
Status: DISCONTINUED | OUTPATIENT
Start: 2024-08-29 | End: 2024-08-29 | Stop reason: HOSPADM

## 2024-08-29 RX ORDER — FOLIC ACID 1 MG/1
1 TABLET ORAL DAILY
Status: DISCONTINUED | OUTPATIENT
Start: 2024-08-29 | End: 2024-08-29 | Stop reason: HOSPADM

## 2024-08-29 RX ORDER — LORAZEPAM 1 MG/1
2 TABLET ORAL
Status: DISCONTINUED | OUTPATIENT
Start: 2024-08-29 | End: 2024-08-29 | Stop reason: HOSPADM

## 2024-08-29 RX ORDER — PANTOPRAZOLE SODIUM 40 MG/10ML
40 INJECTION, POWDER, LYOPHILIZED, FOR SOLUTION INTRAVENOUS ONCE
Status: COMPLETED | OUTPATIENT
Start: 2024-08-29 | End: 2024-08-29

## 2024-08-29 RX ORDER — LORAZEPAM 1 MG/1
1 TABLET ORAL
Status: DISCONTINUED | OUTPATIENT
Start: 2024-08-29 | End: 2024-08-29 | Stop reason: HOSPADM

## 2024-08-29 RX ORDER — SODIUM CHLORIDE 0.9 % (FLUSH) 0.9 %
5-40 SYRINGE (ML) INJECTION EVERY 12 HOURS SCHEDULED
Status: DISCONTINUED | OUTPATIENT
Start: 2024-08-29 | End: 2024-08-29 | Stop reason: HOSPADM

## 2024-08-29 RX ORDER — ACETAMINOPHEN 325 MG/1
650 TABLET ORAL EVERY 6 HOURS PRN
Status: DISCONTINUED | OUTPATIENT
Start: 2024-08-29 | End: 2024-08-29 | Stop reason: HOSPADM

## 2024-08-29 RX ORDER — POLYETHYLENE GLYCOL 3350 17 G/17G
17 POWDER, FOR SOLUTION ORAL DAILY PRN
Status: DISCONTINUED | OUTPATIENT
Start: 2024-08-29 | End: 2024-08-29 | Stop reason: HOSPADM

## 2024-08-29 RX ORDER — LORAZEPAM 2 MG/ML
4 INJECTION INTRAMUSCULAR
Status: DISCONTINUED | OUTPATIENT
Start: 2024-08-29 | End: 2024-08-29 | Stop reason: HOSPADM

## 2024-08-29 RX ORDER — LORAZEPAM 2 MG/ML
1 INJECTION INTRAMUSCULAR
Status: DISCONTINUED | OUTPATIENT
Start: 2024-08-29 | End: 2024-08-29 | Stop reason: HOSPADM

## 2024-08-29 RX ORDER — LORAZEPAM 2 MG/ML
2 INJECTION INTRAMUSCULAR
Status: DISCONTINUED | OUTPATIENT
Start: 2024-08-29 | End: 2024-08-29 | Stop reason: HOSPADM

## 2024-08-29 RX ORDER — ONDANSETRON 4 MG/1
4 TABLET, ORALLY DISINTEGRATING ORAL EVERY 8 HOURS PRN
Status: DISCONTINUED | OUTPATIENT
Start: 2024-08-29 | End: 2024-08-29 | Stop reason: HOSPADM

## 2024-08-29 RX ORDER — LORAZEPAM 1 MG/1
3 TABLET ORAL
Status: DISCONTINUED | OUTPATIENT
Start: 2024-08-29 | End: 2024-08-29 | Stop reason: HOSPADM

## 2024-08-29 RX ORDER — CHLORDIAZEPOXIDE HYDROCHLORIDE 25 MG/1
CAPSULE, GELATIN COATED ORAL
Qty: 15 CAPSULE | Refills: 0 | Status: SHIPPED | OUTPATIENT
Start: 2024-08-29 | End: 2024-09-08

## 2024-08-29 RX ORDER — CHLORDIAZEPOXIDE HYDROCHLORIDE 25 MG/1
25 CAPSULE, GELATIN COATED ORAL 4 TIMES DAILY
Status: DISCONTINUED | OUTPATIENT
Start: 2024-08-29 | End: 2024-08-29 | Stop reason: HOSPADM

## 2024-08-29 RX ORDER — ACETAMINOPHEN 325 MG/1
650 TABLET ORAL ONCE
Status: COMPLETED | OUTPATIENT
Start: 2024-08-29 | End: 2024-08-29

## 2024-08-29 RX ORDER — SODIUM CHLORIDE 0.9 % (FLUSH) 0.9 %
5-40 SYRINGE (ML) INJECTION PRN
Status: DISCONTINUED | OUTPATIENT
Start: 2024-08-29 | End: 2024-08-29 | Stop reason: HOSPADM

## 2024-08-29 RX ORDER — THIAMINE MONONITRATE (VIT B1) 100 MG
100 TABLET ORAL DAILY
Qty: 30 TABLET | Refills: 0 | Status: SHIPPED | OUTPATIENT
Start: 2024-08-29

## 2024-08-29 RX ADMIN — LORAZEPAM 1 MG: 1 TABLET ORAL at 12:13

## 2024-08-29 RX ADMIN — SODIUM CHLORIDE, PRESERVATIVE FREE 10 ML: 5 INJECTION INTRAVENOUS at 08:24

## 2024-08-29 RX ADMIN — THIAMINE HYDROCHLORIDE 100 MG: 100 INJECTION, SOLUTION INTRAMUSCULAR; INTRAVENOUS at 08:23

## 2024-08-29 RX ADMIN — SODIUM CHLORIDE, POTASSIUM CHLORIDE, SODIUM LACTATE AND CALCIUM CHLORIDE: 600; 310; 30; 20 INJECTION, SOLUTION INTRAVENOUS at 00:44

## 2024-08-29 RX ADMIN — CHLORDIAZEPOXIDE HYDROCHLORIDE 25 MG: 25 CAPSULE ORAL at 14:49

## 2024-08-29 RX ADMIN — ACETAMINOPHEN 650 MG: 325 TABLET ORAL at 00:43

## 2024-08-29 RX ADMIN — PANTOPRAZOLE SODIUM 40 MG: 40 INJECTION, POWDER, FOR SOLUTION INTRAVENOUS at 01:31

## 2024-08-29 RX ADMIN — CHLORDIAZEPOXIDE HYDROCHLORIDE 25 MG: 25 CAPSULE ORAL at 08:22

## 2024-08-29 RX ADMIN — LORAZEPAM 1 MG: 2 INJECTION INTRAMUSCULAR; INTRAVENOUS at 00:54

## 2024-08-29 RX ADMIN — ENOXAPARIN SODIUM 40 MG: 100 INJECTION SUBCUTANEOUS at 08:22

## 2024-08-29 RX ADMIN — FOLIC ACID 1 MG: 1 TABLET ORAL at 08:22

## 2024-08-29 ASSESSMENT — PAIN - FUNCTIONAL ASSESSMENT
PAIN_FUNCTIONAL_ASSESSMENT: ACTIVITIES ARE NOT PREVENTED
PAIN_FUNCTIONAL_ASSESSMENT: ACTIVITIES ARE NOT PREVENTED

## 2024-08-29 ASSESSMENT — PAIN SCALES - GENERAL
PAINLEVEL_OUTOF10: 0
PAINLEVEL_OUTOF10: 3
PAINLEVEL_OUTOF10: 6

## 2024-08-29 ASSESSMENT — ENCOUNTER SYMPTOMS
PHOTOPHOBIA: 0
APNEA: 0
SINUS PRESSURE: 0
VOMITING: 0
ABDOMINAL DISTENTION: 0
COLOR CHANGE: 0
ABDOMINAL PAIN: 0
RHINORRHEA: 0
CONSTIPATION: 0
BACK PAIN: 0
WHEEZING: 0
CHOKING: 0
TROUBLE SWALLOWING: 0
SHORTNESS OF BREATH: 0
COUGH: 0
SINUS PAIN: 0
SORE THROAT: 0
CHEST TIGHTNESS: 0
DIARRHEA: 0
NAUSEA: 0

## 2024-08-29 ASSESSMENT — PAIN DESCRIPTION - ORIENTATION
ORIENTATION: ANTERIOR
ORIENTATION: RIGHT;UPPER

## 2024-08-29 ASSESSMENT — PAIN DESCRIPTION - PAIN TYPE: TYPE: ACUTE PAIN

## 2024-08-29 ASSESSMENT — PAIN DESCRIPTION - ONSET: ONSET: ON-GOING

## 2024-08-29 ASSESSMENT — PAIN DESCRIPTION - DESCRIPTORS
DESCRIPTORS: ACHING;DISCOMFORT
DESCRIPTORS: DISCOMFORT;SHARP

## 2024-08-29 ASSESSMENT — PAIN DESCRIPTION - LOCATION
LOCATION: HEAD
LOCATION: ABDOMEN

## 2024-08-29 ASSESSMENT — PAIN DESCRIPTION - FREQUENCY: FREQUENCY: INTERMITTENT

## 2024-08-29 NOTE — DISCHARGE INSTRUCTIONS
Please follow-up with [facility] for hospitalization follow-up and establishing primary care within one-week of discharge.    Please start taking the following medications as prescribed:    1) Folic Acid 1mg tablet daily.  2) Thiamine 100 mg tablet daily.

## 2024-08-29 NOTE — PROGRESS NOTES
Notified hospitalist x 2 regarding patients arrival and no orders in place. Hospitalist responded stating patient is being covered by residents. Residents notified via perfect serve.

## 2024-08-29 NOTE — DISCHARGE SUMMARY
INTERNAL MEDICINE DEPARTMENT  DISCHARGE SUMMARY    Patient ID: Terry Treadwell                                             Discharge Date: 8/29/2024   Patient's PCP: No primary care provider on file.                                          Discharge Physician: Cecilio Myers DO   Admit Date: 8/28/2024   Admitting Physician: Neeru Foote MD    PROBLEMS DURING HOSPITALIZATION:  Present on Admission:   Alcohol dependence with withdrawal (HCC)      DISCHARGE DIAGNOSES:  Alcohol dependence with withdrawal    Hospital Course:      HPI:  Patient admitted on 8/28 with symptoms of headache, N/V, and insomnia following a drinking binge for five days.  Patient mostly drinks vodka and cans of 8% smirnoff.  He reports drinking until passing out and starting once again when he wakes up.  Previously sober for six months and enrolled in sobriety program with the [facility] residing in their sober living facility.  He attributes his relapse due to anxiety with employment.  While inpatient treated with Librium, CIWA protocol with ativan, and IVF.  No seizures or reported hallucinations during admission. Discussed at length the importance of sobriety and offered resources for maintaining sobriety as well as psychiatric options for anxiety.  Patient discharged with daily thiamine and folic acid with plan to follow-up in the [facility] in one week to establish primary care.    On the basis of discharge, patient reported feeling stable.  The patient was found to not be in any acute distress, with vital signs within normal limits, and no abnormalities on physical examination.  Further, the patient expressed appropriate understanding of, and agreement with, the discharge recommendations, medications and plan.        Physical Exam:  Vitals: /75   Pulse 66   Temp 97.6 °F (36.4 °C) (Oral)   Resp 16   Ht 1.651 m (5' 5\")   Wt 84.4 kg (186 lb 1.1 oz)   SpO2 98%   BMI 30.96 kg/m²   I/O :   Intake/Output

## 2024-08-29 NOTE — PROGRESS NOTES
Occupational Therapy  Facility/Department: [address]  Occupational Therapy Initial Assessment & DC    Name: Terry Treadwell  : 1985  MRN: [id number]  Date of Service: 2024    Discharge Recommendations:  Home with assist PRN, Home independently          Patient Diagnosis(es): The primary encounter diagnosis was Alcohol withdrawal syndrome without complication (HCC). Diagnoses of Elevated alkaline phosphatase level, Generalized abdominal pain, and Tremors of nervous system were also pertinent to this visit.  Past Medical History:  has a past medical history of Alcohol abuse.  Past Surgical History:  has a past surgical history that includes fracture surgery (ankle).    Treatment Diagnosis: NA      Assessment  Assessment: From home ([facility]). Pt plans to rtn at [place]. Completing mobility and ADLs with IND this date. Mobility in room, bathroom, hallway, stairs. No acute OT needs. Will sign off.  Treatment Diagnosis: NA  Decision Making: Low Complexity  REQUIRES OT FOLLOW-UP: No  Activity Tolerance  Activity Tolerance: Patient Tolerated treatment well     Plan  Occupational Therapy Plan  Times Per Week: Dc    Restrictions  Position Activity Restriction  Other position/activity restrictions: seizure precautions, CIWA, fall precautions    Subjective  General  Chart Reviewed: Yes  Additional Pertinent Hx: 38 y.o. male with a PMHx significant for abd pain, who presented to the ED on 24 with c/o N/V, HA, and Insomnia following an alcoholic drinking binge. He would drink until passing out, and repeat on waking. Prior to this binge he had been sober for 6 months, and was staying at the [facility] sober living facility. He states that he relapsed due to anxiety he experienced at his work. He reports HA worsening in severity with increased EtOH consumption. N/V since the past 2 days, hasn't been able to keep any food down. N/V is worse with movement, relieved when staying still. He reports  understanding                     G-Code     OutComes Score                                                  AM-PAC - ADL  AM-PAC Daily Activity - Inpatient   How much help is needed for putting on and taking off regular lower body clothing?: None  How much help is needed for bathing (which includes washing, rinsing, drying)?: None  How much help is needed for toileting (which includes using toilet, bedpan, or urinal)?: None  How much help is needed for putting on and taking off regular upper body clothing?: None  How much help is needed for taking care of personal grooming?: None  How much help for eating meals?: None  AM-[facility] Inpatient Daily Activity Raw Score: 24  AM-PAC Inpatient ADL T-Scale Score : 57.54  ADL Inpatient CMS 0-100% Score: 0  ADL Inpatient CMS G-Code Modifier : CH    Tinneti Score       Goals         Therapy Time   Individual Concurrent Group Co-treatment   Time In 1034         Time Out 1057         Minutes 23          Timed Code Treatment Minutes:   8    Total Treatment Minutes:  23     Nallely Zarate, MOT, OTR/L, CNS

## 2024-08-29 NOTE — PROGRESS NOTES
Patient discharged from PCU. IV taken out, tele removed, patient belongings sent with the patient. Patient has AVS and prescriptions.

## 2024-08-29 NOTE — PROGRESS NOTES
Physical Therapy  [address]  Physical Therapy Initial Assessment/Discharge from Acute PT    Name: Terry Treadwell  : 1985  MRN: [id number]  Date of Service: 2024    Discharge Recommendations:  Home with assist PRN   PT Equipment Recommendations  Equipment Needed: No      Patient Diagnosis(es): The primary encounter diagnosis was Alcohol withdrawal syndrome without complication (HCC). Diagnoses of Elevated alkaline phosphatase level, Generalized abdominal pain, and Tremors of nervous system were also pertinent to this visit.  Past Medical History:  has a past medical history of Alcohol abuse.  Past Surgical History:  has a past surgical history that includes fracture surgery (ankle).    Assessment  Assessment: Pt is 38 y.o. with diagnosis of generalized abdominal pain.  No acute goals identified due to pt performing all functional mobility at Michele-Ind level. Pt reporting that he is functioning near his baseline and has no concerns for d/c. Pt denies need for follow up IPPT at this time.  PT will sign off at this time, please re-order should pt's status change.  PT recommends assist PRN for increased safety.  Treatment Diagnosis: generalized abdominal pain  Therapy Prognosis: Good  Decision Making: Low Complexity  Requires PT Follow-Up: No  Activity Tolerance  Activity Tolerance: Patient tolerated treatment well    Plan  Physical Therapy Plan  Additional Comments: No acute goals identified due to pt performing all functional mobility at Michele-Ind level. Pt reporting that he is functioning near his baseline and has no concerns for d/c. Pt denies need for follow up IPPT at this time.  Safety Devices  Type of Devices:  (pt left up ad prudencio in recliner, per PRN, pt is up ad prudencio)    Restrictions  Position Activity Restriction  Other position/activity restrictions: seizure precautions, CIWA, fall precautions     Subjective  Pain: no reports of pain  General  Chart Reviewed: Yes  Patient  WFL  Comment: based on functional mobility        Bed Mobility Training  Bed Mobility Training: Yes  Supine to Sit: Independent  Balance  Sitting: Intact  Standing: Intact  Transfer Training  Transfer Training: Yes  Overall Level of Assistance: Independent  Gait  Gait Training: Yes  Overall Level of Assistance: Independent  Assistive Device: None  Bed mobility  Supine to Sit: Independent (HOB flat)  Bed Mobility Comments: pt donning shoes seated EOB with no assist  Transfers  Sit to Stand: Independent (from EOB and toilet to no AD)  Stand to Sit: Independent  Ambulation  Surface: Level tile  Device: No Device  Assistance: Independent  Quality of Gait: normal aurora, no LOB, intermittent deviation of pathway  Distance: 200' (with stairs in middle of bout) + small distances in room  Comments: Pt reporting that he is ambulating near his baseline and has no concerns for d/c.  Stairs/Curb  Stairs?: Yes  Stairs  # Steps : 12  Stairs Height: 6\"  Rails: Right ascending  Assistance: Modified independent   Comment: Alternating pattern.  pt reporting that he is performing stairs at his baseline and has no concerns for d/c.     Balance  Comments: Ind for dynamic standing balance for pants management, pericare, and hand hygiene at sink for toileting.          OutComes Score                                                  AM-PAC - Mobility    AM-PAC Basic Mobility - Inpatient   How much help is needed turning from your back to your side while in a flat bed without using bedrails?: None  How much help is needed moving from lying on your back to sitting on the side of a flat bed without using bedrails?: None  How much help is needed moving to and from a bed to a chair?: None  How much help is needed standing up from a chair using your arms?: None  How much help is needed walking in hospital room?: None  How much help is needed climbing 3-5 steps with a railing?: None  AM-PAC Inpatient Mobility Raw Score : 24  AM-PAC Inpatient

## 2024-08-29 NOTE — PROGRESS NOTES
4 Eyes Skin Assessment     NAME:  Terry Treadwell  YOB: 1985  MEDICAL RECORD NUMBER:  [id number]    The patient is being assessed for  Admission    I agree that at least one RN has performed a thorough Head to Toe Skin Assessment on the patient. ALL assessment sites listed below have been assessed.      Areas assessed by both nurses:    Head, Face, Ears, Shoulders, Back, Chest, Arms, Elbows, Hands, Sacrum. Buttock, Coccyx, Ischium, Legs. Feet and Heels, and Under Medical Devices         Does the Patient have a Wound? No noted wound(s)       Wilian Prevention initiated by RN: No  Wound Care Orders initiated by RN: No    Pressure Injury (Stage 3,4, Unstageable, DTI, NWPT, and Complex wounds) if present, place Wound referral order by RN under : No    New Ostomies, if present place, Ostomy referral order under : No     Nurse 1 eSignature: Electronically signed by Isabella Ramos RN on 8/28/24 at 10:48 PM EDT    **SHARE this note so that the co-signing nurse can place an eSignature**    Nurse 2 eSignature: {Esignature:[id number]}

## 2024-08-29 NOTE — H&P
Internal Medicine H&P    Date:   2024   Patient:  Terry Treadwell   :   1985     CC:  Alcohol Use Disorder (Pt presents with withdrawal from alcohol (started detox yesterday). Is currently going through the [facility] detox program. Used to drink 16oz cans of vodka. Last drink was Monday @ 1400/1430. Has been through withdrawal in the past )       Source of HPI: Patient    Subjective     HPI:  Mr. Terry Treadwell is a 38 y.o. male with a PMHx significant for AUD, who presented to the ED on 24 with c/o N/V, HA, and Insomnia following an alcoholic drinking binge. The 5 day binge began on 24 and continued until the afternoon of 24. He reports mostly drinking 8% alcohol cans of Smirnoff. He would drink until passing out, and repeat on waking. Prior to this binge he had been sober for 6 months, and was staying at the [facility] sober living facility. He states that he relapsed due to anxiety he experienced at his work. He reports HA worsening in severity with increased EtOH consumption. N/V since the past 2 days, hasn't been able to keep any food down. N/V is worse with movement, relieved when staying still. He reports feeling anxious at baseline. BAWS score is 1. CIWA is 7. He is interested in rehab and medical alcohol cessation assistance. He denies use of tobacco, marijuana, or other recreational drugs. He is not sexually active.      ED Course:  On arrival to the ED, he was found to have RUQ and epigastric pain w/ tenderness, N/V  Labs were significant for:  NA of 134  Alk Phos of 135  MCV of 78.1  Imaging showed no significant findings  While in the ED, he received:  Ketorolac 30 mg IV  Lorazepam 1 mg PO  Thiamine 100 mg  Normal Saline infusion  Ondansetron 4 mg IV      PMHx:      Diagnosis Date    Alcohol abuse        PSurgHx:      Procedure Laterality Date    FRACTURE SURGERY  ankle        Allergies:  Amoxicillin    Home Medication:  Prior to    Electronically signed by Iraj Bailon            Assessment & Plan   Mr. Terry Treadwell is a 38 y.o. male with a PMHx significant for AUD, who presented to the ED on 2/28/24 with c/o N/V, HA, and Insomnia following an alcoholic drinking binge.    Alcohol Withdrawal  Pt reports episodes of binge drinking, most recently for 5 days from Wednesday to Monday afternoon. Prior to this episode he reports being sober for 6 months. He states that he relapsed due to anxiety at his work. Patient was staying at [facility], but may not be able to return there if he cannot work. BAWS score was 1 CIWA score was 7.  - Librium 25 mg PO QID  - Thiamine 100 mg IV qD  - Folate 1 mg PO qD  - IVF  - C/s Social work  - C/s Telepsych  - CIWA w/ Ativan as needed    DVT PPx:  Lovenox  Diet:  ADULT DIET; Regular   Code status:  Full Code     Will discuss with attending physician Elpidio Barton MD Mudasser Mohammed, MD, PGY-1  Internal Medicine Resident  Contact via [employer]     I saw and evaluated the patient, performing the key elements of the service.  I discussed the findings, assessment and plan with the resident and agree with the resident's findings and plan as documented in the resident's note.

## 2024-08-29 NOTE — VIRTUAL HEALTH
[place] Consult to Tele-psych Provider  Consult performed by: Phoebe Johnson, APRN - CNP  Consult ordered by: Fabby Abarca MD  Reason for consult: Other: severe depression/anxiety      Terry Treadwell  [id number]  1985     INPATIENT TELEPSYCHIATRY EVALUATION    08/29/24    Chief Complaint:  “I've been having really bad anxiety.”    HPI: Patient is a 38 y.o.  male who presents for psychiatric evaluation. Per chart, \"PMHx significant for AUD, who presented to the ED on 2/28/24 with c/o N/V, HA, and Insomnia following an alcoholic drinking binge. The 5 day binge began on Wednesday 8/21/24 and continued until the afternoon of Monday 8/26/24. He reports mostly drinking 8% alcohol cans of Smirnoff. He would drink until passing out, and repeat on waking. Prior to this binge he had been sober for 6 months, and was staying at the [facility] sober living facility. He states that he relapsed due to anxiety he experienced at his work. He reports HA worsening in severity with increased EtOH consumption. N/V since the past 2 days, hasn't been able to keep any food down. N/V is worse with movement, relieved when staying still. He reports feeling anxious at baseline. BAWS score is 1. CIWA is 7. He is interested in rehab and medical alcohol cessation assistance. He denies use of tobacco, marijuana, or other recreational drugs. He is not sexually active.\" Telepsychiatry consulted for depression/anxiety. Upon assessment today patient is alert, oriented, and agreeable to participate in assessment. Patient is seated in bed, calm and cooperative with interview. Patient states that he has had anxiety for years and has been coping with it by drinking alcohol. Patient reports symptoms including racing thoughts, worrying, and having difficulty relaxing. Patient states that he has been living in a sober living house but has continued to be anxious due to not drinking. Also reports that he was having

## 2024-08-29 NOTE — PROGRESS NOTES
Internal Medicine Progress Note    Patient Name: Terry Treadwell   Patient : 1985   Date: 2024   Admit Date: 2024     CC: Alcohol Use Disorder (Pt presents with withdrawal from alcohol (started detox yesterday). Is currently going through the [facility] detox program. Used to drink 16oz cans of vodka. Last drink was Monday @ 1400/1430. Has been through withdrawal in the past )       Interval History   NAEON.  VSS and HDS.  Ethanol level negative x2, UDS negative.  CBC, BMP, LFT WNL.  Continues to complain of diffuse abdominal discomfort, denies N/V/D.    Discussed reason for starting drinking after six months of sobriety.  Patient states he is trying to do better for himself and is actively looking for work.  He states he has severe anxiety that interferes with ADLs and chooses to self medicate with alcohol.  He states he would like to speak with a professional to help manage his anxiety as well as learn about resources available to him.  Tele-psych consulted this am.  No agitation, hallucinations, or seizure activity.  Will continue IVF and librium for now.  CIWA w/ativan in place.    HPI:  Mr. Terry Treadwell is a 38 y.o. male with a PMHx significant for AUD, who presented to the ED on 24 with c/o N/V, HA, and Insomnia following an alcoholic drinking binge. The 5 day binge began on 24 and continued until the afternoon of 24. He reports mostly drinking 8% alcohol cans of Smirnoff. He would drink until passing out, and repeat on waking. Prior to this binge he had been sober for 6 months, and was staying at the [facility] sober living facility. He states that he relapsed due to anxiety he experienced at his work. He reports HA worsening in severity with increased EtOH consumption. N/V since the past 2 days, hasn't been able to keep any food down. N/V is worse with movement, relieved when staying still. He reports feeling anxious at baseline. BAWS  SubCUTAneous Daily    folic acid  1 mg Oral Daily    chlordiazePOXIDE  25 mg Oral 4x Daily    thiamine  100 mg IntraVENous Daily       sodium chloride      sodium chloride Stopped (08/28/24 1605)      sodium chloride flush, sodium chloride, ondansetron **OR** ondansetron, polyethylene glycol, acetaminophen **OR** acetaminophen, LORazepam **OR** LORazepam **OR** LORazepam **OR** LORazepam **OR** LORazepam **OR** LORazepam **OR** LORazepam **OR** LORazepam     Labs:  CBC:   Recent Labs     08/28/24  1432 08/29/24  0738   WBC 5.9 5.7   HGB 14.2 13.4*   HCT 44.4 42.2    244   MCV 78.1* 80.3       Renal:    Recent Labs     08/28/24  1432 08/29/24  0738   * 139   K 4.0 3.9    104   CO2 19* 23   BUN 6* 7   CREATININE 0.7* 0.9   GLUCOSE 116* 93   CALCIUM 9.0 8.5   PHOS  --  3.7   ANIONGAP 14 12       Hepatic:   Recent Labs     08/28/24  1432 08/29/24  0738   AST 30 24   ALT 13 11   BILITOT 0.6 0.4   ALKPHOS 135* 116       Troponin:   Recent Labs     08/28/24  1432   TROPHS <6         Pro-BNP: No results for input(s): \"PROBNP\" in the last 72 hours.    Lipids: No results for input(s): \"CHOL\", \"TRIG\", \"HDL\", \"VLDL\" in the last 72 hours.    Invalid input(s): \"LDLCALC\"      ABGs:  No results for input(s): \"PHART\", \"QDF9CQN\", \"PO2ART\", \"FZQ4ASV\", \"BEART\", \"THGBART\", \"X5MTXWWB\", \"XTP7VTG\" in the last 72 hours.    VBGs: No results for input(s): \"PHVEN\", \"FUA2IYO\", \"PO2VEN\" in the last 72 hours.    INR: No results for input(s): \"INR\" in the last 72 hours.  aPTT: No results for input(s): \"APTT\" in the last 72 hours.    Procalcitonin: No results for input(s): \"PROCAL\" in the last 72 hours.  CRP: No results for input(s): \"CRP\" in the last 72 hours.  ESR: No results for input(s): \"SEDRATE\" in the last 72 hours.      Microbiology:  Results       ** No results found for the last 336 hours. **               Radiology:  CT ABDOMEN PELVIS WO CONTRAST Additional Contrast? None   Final Result      No acute findings are seen

## 2025-06-03 ENCOUNTER — HOSPITAL ENCOUNTER (EMERGENCY)
Age: 40
Discharge: HOME | End: 2025-06-03
Attending: EMERGENCY MEDICINE
Payer: COMMERCIAL

## 2025-06-03 ENCOUNTER — APPOINTMENT (OUTPATIENT)
Dept: GENERAL RADIOLOGY | Age: 40
End: 2025-06-03
Payer: COMMERCIAL

## 2025-06-03 VITALS
TEMPERATURE: 97.9 F | BODY MASS INDEX: 31.89 KG/M2 | HEART RATE: 89 BPM | RESPIRATION RATE: 12 BRPM | WEIGHT: 198.41 LBS | OXYGEN SATURATION: 99 % | SYSTOLIC BLOOD PRESSURE: 120 MMHG | HEIGHT: 66 IN | DIASTOLIC BLOOD PRESSURE: 95 MMHG

## 2025-06-03 DIAGNOSIS — S82.831A CLOSED FRACTURE OF DISTAL END OF RIGHT FIBULA, UNSPECIFIED FRACTURE MORPHOLOGY, INITIAL ENCOUNTER: Primary | ICD-10-CM

## 2025-06-03 DIAGNOSIS — M25.571 ACUTE RIGHT ANKLE PAIN: ICD-10-CM

## 2025-06-03 PROCEDURE — 29515 APPLICATION SHORT LEG SPLINT: CPT

## 2025-06-03 PROCEDURE — 73610 X-RAY EXAM OF ANKLE: CPT

## 2025-06-03 PROCEDURE — 99283 EMERGENCY DEPT VISIT LOW MDM: CPT

## 2025-06-03 RX ORDER — IBUPROFEN 600 MG/1
600 TABLET, FILM COATED ORAL 4 TIMES DAILY PRN
Qty: 20 TABLET | Refills: 0 | Status: SHIPPED | OUTPATIENT
Start: 2025-06-03 | End: 2025-06-08

## 2025-06-03 ASSESSMENT — PAIN - FUNCTIONAL ASSESSMENT
PAIN_FUNCTIONAL_ASSESSMENT: 0-10
PAIN_FUNCTIONAL_ASSESSMENT: ACTIVITIES ARE NOT PREVENTED

## 2025-06-03 ASSESSMENT — PAIN DESCRIPTION - ORIENTATION: ORIENTATION: RIGHT;ANTERIOR

## 2025-06-03 ASSESSMENT — PAIN DESCRIPTION - DESCRIPTORS: DESCRIPTORS: SHARP;ACHING

## 2025-06-03 ASSESSMENT — PAIN DESCRIPTION - LOCATION: LOCATION: ANKLE

## 2025-06-03 ASSESSMENT — PAIN DESCRIPTION - ONSET: ONSET: ON-GOING

## 2025-06-03 ASSESSMENT — PAIN DESCRIPTION - FREQUENCY: FREQUENCY: INTERMITTENT

## 2025-06-03 ASSESSMENT — PAIN DESCRIPTION - PAIN TYPE: TYPE: ACUTE PAIN

## 2025-06-03 ASSESSMENT — PAIN SCALES - GENERAL: PAINLEVEL_OUTOF10: 6

## 2025-06-03 NOTE — ED PROVIDER NOTES
[facility] EMERGENCY DEPARTMENT     EMERGENCY DEPARTMENT ENCOUNTER     Location: [facility] EMERGENCY DEPARTMENT  6/3/2025  Note Started: 9:10 AM EDT 6/3/25      Patient Identification  Terry Treadwell is a 39 y.o. male      HPI:Terry Treadwell was evaluated in the Emergency Department for right ankle pain.  Patient initially injured his ankle 4 weeks ago with recurrent injury 2 weeks ago.  He has noted pain and swelling both along the medial and lateral aspects of the ankle.  Patient has been ambulatory with discomfort.  No numbness or tingling.. Although initial history and physical exam information was obtained by SERGEI/NPP/MD/ (who also dictated a record of this visit), I personally saw the patient and performed and made/approved the management plan and take responsibility for the patient management.      PHYSICAL EXAM:    General: No acute distress.  Head: Normocephalic/atraumatic.  Heart: Regular rhythm.  Normal S1-S2.  Lungs: Clear to auscultation bilaterally.  Right lower extremity: Hip within normal limits.  Knee is atraumatic.  No evidence of tenderness of the proximal fibula.  Diffuse tenderness over the medial and lateral malleolus with mild swelling over the lateral malleolus.  No gross deformities.  Range of motion near normal.  Dorsalis pedis and posterior tibialis pulses intact.      Patient seen and evaluated.  Relevant records reviewed.  MDM  Patient presents to the emergency department with isolated injury to right ankle.      I independently interpreted the following studies:     Right ankle x-ray with displaced fracture of the distal fibula.  Confirmed with radiology read.      Short posterior with sugar-tong splint placed.          CLINICAL IMPRESSION  1. Closed fracture of distal end of right fibula, unspecified fracture morphology, initial encounter    2. Acute right ankle pain          Ming SHEFFIELD II, DO, am the primary clinician of record.   I personally saw the

## 2025-06-03 NOTE — ED PROVIDER NOTES
[facility] EMERGENCY DEPARTMENT  EMERGENCY DEPARTMENT ENCOUNTER        Pt Name: Terry Treadwell  MRN: [id number]  Birthdate 1985  Date of evaluation: 6/3/2025  Provider: Raul Underwood PA-C  PCP: No primary care provider on file.  Note Started: 9:04 AM EDT 6/3/25       I have seen and evaluated this patient with my supervising physician Ming Sorenson II, DO.      CHIEF COMPLAINT       Chief Complaint   Patient presents with    Ankle Pain       HISTORY OF PRESENT ILLNESS: 1 or more Elements     History From: Patient  Limitations to history : None    Terry Treadwell is a 39 y.o. male who presents brought in by friend via private vehicle.  Terry is a 39-year-old male with no significant past medical history who presents for evaluation of right ankle pain.  He reports that approximately 4 weeks ago he slipped on a cardboard box and heard a \"crack\" in his right ankle with subsequent pain.  He reported that the pain was gradually improving when approximately 1.5 weeks ago he jumped on a step and experienced pain.  He reports that the pain since then has been constant with some mild associated swelling.  Although he is able to bear weight, he reports that the pain is worsened with prolonged standing and walking. He reports that he has been trialing ibuprofen and lidocaine patches with some relief.  Otherwise he denies any other associated symptoms including fevers, chills, rashes/skin changes, chest pain, shortness of breath, abdominal pain, dysuria, melena/hematochezia or other sick symptoms.    Nursing Notes were all reviewed and agreed with or any disagreements were addressed in the HPI.    REVIEW OF SYSTEMS :      Review of Systems   All other systems reviewed and are negative.      Positives and Pertinent negatives as per HPI.      has a past medical history of Alcohol abuse.    SURGICAL HISTORY     Past Surgical History:   Procedure Laterality Date    FRACTURE SURGERY  ankle

## 2025-06-11 ENCOUNTER — OFFICE VISIT (OUTPATIENT)
Dept: ORTHOPEDIC SURGERY | Age: 40
End: 2025-06-11

## 2025-06-11 VITALS — WEIGHT: 198 LBS | BODY MASS INDEX: 31.82 KG/M2 | HEIGHT: 66 IN

## 2025-06-11 DIAGNOSIS — S82.831A CLOSED FRACTURE OF DISTAL END OF RIGHT FIBULA, UNSPECIFIED FRACTURE MORPHOLOGY, INITIAL ENCOUNTER: ICD-10-CM

## 2025-06-11 DIAGNOSIS — R52 PAIN: Primary | ICD-10-CM

## 2025-06-11 NOTE — PROGRESS NOTES
in 6 weeks for x-ray of the ankle standing followed by clinical exam.  He should follow-up sooner with any problems.    I discussed with Terrysg Treadwell that his history, symptoms, signs, and imaging are most consistent with  lateral malleolus fracture .    We reviewed the natural history of these conditions and treatment options ranging from conservative measures (rest, icing, activity modification, physical therapy, pain meds) to surgical options.     In terms of treatment, I recommended continuing with rest, icing, avoidance of painful activities, NSAIDs or pain meds as tolerated, and physical therapy.     We discussed surgical options as well, should conservative measures fail.     I have spent 45 minutes providing this service today to include the time spent seeing the patient, documenting, reviewing prior tests, and reviewing chart.  This time did exclude any separately billed procedures